# Patient Record
Sex: FEMALE | Race: WHITE
[De-identification: names, ages, dates, MRNs, and addresses within clinical notes are randomized per-mention and may not be internally consistent; named-entity substitution may affect disease eponyms.]

---

## 2018-07-18 ENCOUNTER — HOSPITAL ENCOUNTER (OUTPATIENT)
Dept: HOSPITAL 76 - LAB.F | Age: 48
Discharge: HOME | End: 2018-07-18
Attending: NURSE PRACTITIONER
Payer: COMMERCIAL

## 2018-07-18 DIAGNOSIS — Z72.51: Primary | ICD-10-CM

## 2018-07-18 PROCEDURE — 87491 CHLMYD TRACH DNA AMP PROBE: CPT

## 2018-07-18 PROCEDURE — 86592 SYPHILIS TEST NON-TREP QUAL: CPT

## 2018-07-18 PROCEDURE — 87389 HIV-1 AG W/HIV-1&-2 AB AG IA: CPT

## 2018-07-18 PROCEDURE — 81599 UNLISTED MAAA: CPT

## 2018-07-18 PROCEDURE — 87591 N.GONORRHOEAE DNA AMP PROB: CPT

## 2018-07-18 PROCEDURE — 36415 COLL VENOUS BLD VENIPUNCTURE: CPT

## 2018-07-19 LAB — HIV AG/AB 4TH GEN: (no result)

## 2018-08-19 ENCOUNTER — HOSPITAL ENCOUNTER (EMERGENCY)
Dept: HOSPITAL 76 - ED | Age: 48
Discharge: HOME | End: 2018-08-19
Payer: COMMERCIAL

## 2018-08-19 VITALS — DIASTOLIC BLOOD PRESSURE: 68 MMHG | SYSTOLIC BLOOD PRESSURE: 117 MMHG

## 2018-08-19 DIAGNOSIS — S90.821A: ICD-10-CM
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
## 2018-08-19 DIAGNOSIS — F17.200: ICD-10-CM
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
## 2018-08-19 DIAGNOSIS — S90.822A: Primary | ICD-10-CM

## 2018-08-19 PROCEDURE — 99283 EMERGENCY DEPT VISIT LOW MDM: CPT

## 2018-08-19 NOTE — ED PHYSICIAN DOCUMENTATION
History of Present Illness





- Stated complaint


Stated Complaint: BLISTERS ON FEET BOTTOM





- Chief complaint


Chief Complaint: Wound





- History obtained from


History obtained from: Patient





- History of Present Illness


Timing: Yesterday


Pain level max: 9


Pain level now: 9





- Additonal information


Additional information: 





Patient states that she has developed blisters on the soles of her bilateral 

feet after walking around [place] yesterday in the wrong shoes.  Worse with 

walking, better with rest.  No fevers.  Took Tylenol and Motrin earlier without 

relief





Review of Systems


Constitutional: denies: Fever, Chills


GI: denies: Vomiting


: denies: Now pregnant EGA





PD PAST MEDICAL HISTORY





- Past Medical History


Past Medical History: Yes


Cardiovascular: None


Respiratory: None


Neuro: Migraines


Endocrine/Autoimmune: None


GI: None


GYN: None


: None


HEENT: None


Psych: None


Musculoskeletal: Chronic back pain


Derm: None





- Past Surgical History


Past Surgical History: Yes


General: Cholecystectomy, Hiatal hernia repair


/GYN:  section, Endometrial ablation, Dilation and currettage, 

Hysterectomy, Oophrectomy





- Present Medications


Home Medications: 


 Ambulatory Orders











 Medication  Instructions  Recorded  Confirmed


 


Hydrocodone/Acetaminophen 1 - 2 each PO Q6H PRN #8 tablet 18 





[Hydrocodon-Acetaminophen 5-325]   


 


Ibuprofen [Motrin] 800 mg PO Q8H PRN #30 tablet 18 














- Allergies


Allergies/Adverse Reactions: 


 Allergies











Allergy/AdvReac Type Severity Reaction Status Date / Time


 


ketorolac [From Toradol] AdvReac  Unknown Verified 18 19:18


 


metronidazole [From Flagyl] AdvReac  Hives Verified 18 19:18


 


Penicillins AdvReac  Nausea Verified 18 19:18


 


prochlorperazine AdvReac  Unknown Verified 18 19:18





[From Compazine]     


 


sumatriptan [From Imitrex] AdvReac  Nausea Verified 18 19:18














- Social History


Does the pt smoke?: Yes


Smoking Status: Current every day smoker


Does the pt drink ETOH?: No


Does the pt have substance abuse?: Yes


Substance Use and Type: Marijuana





- Immunizations


Immunizations are current?: Yes


Immunizations: TDAP current <10years





- POLST


Patient has POLST: No





PD ED PE NORMAL





- Vitals


Vital signs reviewed: Yes





- General


General: Alert and oriented X 3, No acute distress





- HEENT


HEENT: Moist mucous membranes, Pharynx benign





- Neck


Neck: Supple, no meningeal sign





- Derm


Derm: Warm and dry





- Extremities


Extremities: Other (Mild erythema to the plantar aspects of the bilateral feet.

  There are blisters approximately 2 x 2 cm on the plantar aspect of the heels 

bilaterally.  Minimal fluid.  Also small blisters at the plantar aspect of the 

first metatarsalPhalangeal joints bilaterally.  Also has a small fluid-filled 

blister on the second toe of the right foot.  This is intact)





- Neuro


Neuro: Alert and oriented X 3





- Psych


Psych: Normal mood, Normal affect





Results





- Vitals


Vitals: 


 Vital Signs - 24 hr











  18





  19:13 20:16


 


Temperature 36.5 C 36.6 C


 


Heart Rate 77 70


 


Respiratory 18 16





Rate  


 


Blood Pressure 110/63 117/68


 


O2 Saturation 100 96








 Oxygen











O2 Source                      Room air

















PD MEDICAL DECISION MAKING





- ED course


Complexity details: considered differential, d/w patient


ED course: 





Patient with friction blisters to the bilateral feet.  Lidocaine gel was 

applied which helped her pain.  Also prescribe a small amount of pain 

medication for home.  We will have her perform Epsom salt soaks at home.  We 

will have her follow-up with her doctor for further care.  No signs of 

infection.  Tetanus is up-to-date.  Patient counseled regarding signs and 

symptoms for which I believe and urgent re-evaluation would be necessary. 

Patient with good understanding of and agreement to plan and is comfortable 

going home at this time





This document was made in part using voice recognition software. While efforts 

are made to proofread this document, sound alike and grammatical errors may 

occur.





- Sepsis Event


Vital Signs: 


 Vital Signs - 24 hr











  18 18





  19:13 20:16


 


Temperature 36.5 C 36.6 C


 


Heart Rate 77 70


 


Respiratory 18 16





Rate  


 


Blood Pressure 110/63 117/68


 


O2 Saturation 100 96








 Oxygen











O2 Source                      Room air

















Departure





- Departure


Disposition: 01 Home, Self Care


Clinical Impression: 


Friction blisters of sole of left foot


Qualifiers:


 Encounter type: initial encounter Qualified Code(s): S90.822A - Blister (

nonthermal), left foot, initial encounter





Friction blisters of sole of right foot


Qualifiers:


 Encounter type: initial encounter Qualified Code(s): S90.821A - Blister (

nonthermal), right foot, initial encounter





Condition: Good


Instructions:  ED Blister


Follow-Up: 


Franchesca Barnes ARNP [Primary Care Provider] - Within 3 Days (for wound check)


Prescriptions: 


Hydrocodone/Acetaminophen [Hydrocodon-Acetaminophen 5-325] 1 - 2 each PO Q6H 

PRN #8 tablet


 PRN Reason: pain


Ibuprofen [Motrin] 800 mg PO Q8H PRN #30 tablet


 PRN Reason: PAIN &/OR FEVER


Comments: 


Return if you worsen. You can soak your feet in epsom salts at home.


Discharge Date/Time: 18 20:16

## 2018-08-22 ENCOUNTER — HOSPITAL ENCOUNTER (OUTPATIENT)
Dept: HOSPITAL 76 - LAB.F | Age: 48
Discharge: HOME | End: 2018-08-22
Attending: NURSE PRACTITIONER
Payer: COMMERCIAL

## 2018-08-22 DIAGNOSIS — Z13.220: ICD-10-CM

## 2018-08-22 DIAGNOSIS — G62.9: Primary | ICD-10-CM

## 2018-08-22 DIAGNOSIS — Z13.1: ICD-10-CM

## 2018-08-22 LAB
ALBUMIN DIAFP-MCNC: 4.3 G/DL (ref 3.2–5.5)
ALBUMIN/GLOB SERPL: 1.4 {RATIO} (ref 1–2.2)
ALP SERPL-CCNC: 76 IU/L (ref 42–121)
ALT SERPL W P-5'-P-CCNC: 23 IU/L (ref 10–60)
ANION GAP SERPL CALCULATED.4IONS-SCNC: 8 MMOL/L (ref 6–13)
AST SERPL W P-5'-P-CCNC: 27 IU/L (ref 10–42)
BASOPHILS NFR BLD AUTO: 0 10^3/UL (ref 0–0.1)
BASOPHILS NFR BLD AUTO: 0.4 %
BILIRUB BLD-MCNC: 0.6 MG/DL (ref 0.2–1)
BUN SERPL-MCNC: 19 MG/DL (ref 6–20)
CALCIUM UR-MCNC: 9.5 MG/DL (ref 8.5–10.3)
CHLORIDE SERPL-SCNC: 103 MMOL/L (ref 101–111)
CHOLEST SERPL-MCNC: 238 MG/DL
CO2 SERPL-SCNC: 28 MMOL/L (ref 21–32)
CREAT SERPLBLD-SCNC: 0.6 MG/DL (ref 0.4–1)
EOSINOPHIL # BLD AUTO: 0.1 10^3/UL (ref 0–0.7)
EOSINOPHIL NFR BLD AUTO: 1.6 %
ERYTHROCYTE [DISTWIDTH] IN BLOOD BY AUTOMATED COUNT: 14.4 % (ref 12–15)
EST. AVERAGE GLUCOSE BLD GHB EST-MCNC: 103 MG/DL (ref 70–100)
GFRSERPLBLD MDRD-ARVRAT: 107 ML/MIN/{1.73_M2} (ref 89–?)
GLOBULIN SER-MCNC: 3 G/DL (ref 2.1–4.2)
GLUCOSE SERPL-MCNC: 92 MG/DL (ref 70–100)
HB2 TOTAL: 14.9 G/DL
HBA1C BLD-MCNC: 0.5 G/DL
HDLC SERPL-MCNC: 71 MG/DL
HDLC SERPL: 3.4 {RATIO} (ref ?–4.4)
HEMOGLOBIN A1C %: 5.2 % (ref 4.6–6.2)
HGB UR QL STRIP: 14.1 G/DL (ref 12–16)
LDLC SERPL CALC-MCNC: 136 MG/DL
LDLC/HDLC SERPL: 1.9 {RATIO} (ref ?–4.4)
LYMPHOCYTES # SPEC AUTO: 1.9 10^3/UL (ref 1.5–3.5)
LYMPHOCYTES NFR BLD AUTO: 30.5 %
MCH RBC QN AUTO: 33.3 PG (ref 27–31)
MCHC RBC AUTO-ENTMCNC: 34.6 G/DL (ref 32–36)
MCV RBC AUTO: 96.3 FL (ref 81–99)
MONOCYTES # BLD AUTO: 0.4 10^3/UL (ref 0–1)
MONOCYTES NFR BLD AUTO: 6.4 %
NEUTROPHILS # BLD AUTO: 3.9 10^3/UL (ref 1.5–6.6)
NEUTROPHILS # SNV AUTO: 6.4 X10^3/UL (ref 4.8–10.8)
NEUTROPHILS NFR BLD AUTO: 61.1 %
PDW BLD AUTO: 9.8 FL (ref 7.9–10.8)
PLATELET # BLD: 134 10^3/UL (ref 130–450)
PROT SPEC-MCNC: 7.3 G/DL (ref 6.7–8.2)
RBC MAR: 4.25 10^6/UL (ref 4.2–5.4)
SODIUM SERPLBLD-SCNC: 139 MMOL/L (ref 135–145)
VLDLC SERPL-SCNC: 31 MG/DL

## 2018-08-22 PROCEDURE — 84443 ASSAY THYROID STIM HORMONE: CPT

## 2018-08-22 PROCEDURE — 80053 COMPREHEN METABOLIC PANEL: CPT

## 2018-08-22 PROCEDURE — 80061 LIPID PANEL: CPT

## 2018-08-22 PROCEDURE — 83721 ASSAY OF BLOOD LIPOPROTEIN: CPT

## 2018-08-22 PROCEDURE — 85025 COMPLETE CBC W/AUTO DIFF WBC: CPT

## 2018-08-22 PROCEDURE — 83036 HEMOGLOBIN GLYCOSYLATED A1C: CPT

## 2018-08-22 PROCEDURE — 36415 COLL VENOUS BLD VENIPUNCTURE: CPT

## 2018-10-29 ENCOUNTER — HOSPITAL ENCOUNTER (EMERGENCY)
Dept: HOSPITAL 76 - ED | Age: 48
Discharge: HOME | End: 2018-10-29
Payer: COMMERCIAL

## 2018-10-29 VITALS — SYSTOLIC BLOOD PRESSURE: 183 MMHG | DIASTOLIC BLOOD PRESSURE: 83 MMHG

## 2018-10-29 DIAGNOSIS — F17.200: ICD-10-CM

## 2018-10-29 DIAGNOSIS — J02.9: Primary | ICD-10-CM

## 2018-10-29 DIAGNOSIS — R03.0: ICD-10-CM

## 2018-10-29 PROCEDURE — 99283 EMERGENCY DEPT VISIT LOW MDM: CPT

## 2018-10-29 PROCEDURE — 87070 CULTURE OTHR SPECIMN AEROBIC: CPT

## 2018-10-29 PROCEDURE — 87430 STREP A AG IA: CPT

## 2018-10-29 PROCEDURE — 87275 INFLUENZA B AG IF: CPT

## 2018-10-29 PROCEDURE — 87276 INFLUENZA A AG IF: CPT

## 2018-10-29 NOTE — ED PHYSICIAN DOCUMENTATION
PD HPI URI





- Stated complaint


Stated Complaint: SORE THROAT





- Chief complaint


Chief Complaint: Resp





- History obtained from


History obtained from: Patient





- History of Present Illness


Timing - onset: Other (She has had about a week of sore throat with intermittent

loss of voice associated with some nonproductive cough and postnasal drip 

without fevers or shortness of breath.)





Review of Systems


Constitutional: denies: Fever, Chills


Nose: reports: Rhinorrhea / runny nose, Congestion


Throat: reports: Sore throat


Respiratory: reports: Cough.  denies: Dyspnea





PD PAST MEDICAL HISTORY





- Past Medical History


Past Medical History: Yes


Cardiovascular: None


Respiratory: None


Neuro: Migraines


Endocrine/Autoimmune: None


GI: None


GYN: None


: None


HEENT: None


Psych: None


Musculoskeletal: Chronic back pain


Derm: None





- Past Surgical History


Past Surgical History: Yes


General: Cholecystectomy, Hiatal hernia repair


/GYN:  section, Endometrial ablation, Dilation and currettage, 

Hysterectomy, Oophrectomy





- Present Medications


Home Medications: 


                                Ambulatory Orders











 Medication  Instructions  Recorded  Confirmed


 


Hydrocodone/Acetaminophen 1 - 2 each PO Q6H PRN #8 tablet 18 





[Hydrocodon-Acetaminophen 5-325]   


 


Ibuprofen [Motrin] 800 mg PO Q8H PRN #30 tablet 18 


 


Hydrocodone/Acetaminophen 1 - 2 each PO Q6H PRN #7 tablet 10/29/18 





[Hydrocodon-Acetaminophen 5-325]   


 


predniSONE [Prednisone] 60 mg PO DAILY 5 Days #15 tablet 10/29/18 














- Allergies


Allergies/Adverse Reactions: 


                                    Allergies











Allergy/AdvReac Type Severity Reaction Status Date / Time


 


ketorolac [From Toradol] AdvReac  Unknown Verified 10/29/18 18:06


 


metronidazole [From Flagyl] AdvReac  Hives Verified 10/29/18 18:06


 


Penicillins AdvReac  Nausea Verified 10/29/18 18:06


 


prochlorperazine AdvReac  Unknown Verified 10/29/18 18:06





[From Compazine]     


 


sumatriptan [From Imitrex] AdvReac  Nausea Verified 10/29/18 18:06














- Social History


Does the pt smoke?: Yes


Smoking Status: Current every day smoker


Does the pt drink ETOH?: No


Does the pt have substance abuse?: Yes





- Immunizations


Immunizations are current?: Yes


Immunizations: TDAP current <10years





- POLST


Patient has POLST: No





PD ED PE NORMAL





- Vitals


Vital signs reviewed: Yes





- General


General: Alert and oriented X 3, No acute distress





- HEENT


HEENT: PERRL, EOMI, Ears normal, Pharynx benign (No evidence of oropharyngeal 

irritation, no adenopathy)





- Neck


Neck: Supple, no meningeal sign





- Cardiac


Cardiac: RRR, No murmur





- Respiratory


Respiratory: No respiratory distress, Other (Diminished throughout without focal

findings)





- Abdomen


Abdomen: Non tender





- Neuro


Neuro: Alert and oriented X 3, Normal speech





Results





- Vitals


Vitals: 





                               Vital Signs - 24 hr











  10/29/18 10/29/18





  18:00 20:33


 


Temperature 36.8 C 36.9 C


 


Heart Rate 83 78


 


Respiratory 20 18





Rate  


 


Blood Pressure 128/75 132/79 H


 


O2 Saturation 98 97








                                     Oxygen











O2 Source                      Room air

















- Labs


Labs: 





                                Laboratory Tests











  10/29/18 10/29/18





  18:10 18:10


 


Influenza A (Rapid)   Negative


 


Influenza B (Rapid)   Negative


 


Group A Strep Rapid  Negative 














Departure





- Departure


Disposition: 01 Home, Self Care


Clinical Impression: 


 Viral pharyngitis





Condition: Good


Record reviewed to determine appropriate education?: Yes


Instructions:  ED Pharyngitis Viral


Prescriptions: 


Hydrocodone/Acetaminophen [Hydrocodon-Acetaminophen 5-325] 1 - 2 each PO Q6H PRN

#7 tablet


 PRN Reason: pain


predniSONE [Prednisone] 60 mg PO DAILY 5 Days #15 tablet


Comments: 


Call your doctor to arrange a follow-up appointment, make the next available 

appointment.  In the interim, return anytime if worse or if new symptoms 

develop.





Your blood pressure was elevated today on check into the emergency department.  

This does not mean that you have hypertension, it is a common phenomenon to come

 to the emergency department and have elevated blood pressure.  I recommend that

 you see your primary care physician within the week to have it rechecked when 

you are feeling better.

## 2018-11-08 ENCOUNTER — HOSPITAL ENCOUNTER (OUTPATIENT)
Dept: HOSPITAL 76 - DI | Age: 48
Discharge: HOME | End: 2018-11-08
Attending: NURSE PRACTITIONER
Payer: COMMERCIAL

## 2018-11-08 DIAGNOSIS — R53.83: ICD-10-CM

## 2018-11-08 DIAGNOSIS — M54.6: Primary | ICD-10-CM

## 2018-11-08 DIAGNOSIS — E78.5: ICD-10-CM

## 2018-11-08 DIAGNOSIS — G62.9: ICD-10-CM

## 2018-11-08 LAB
ALBUMIN DIAFP-MCNC: 4.2 G/DL (ref 3.2–5.5)
ALP SERPL-CCNC: 77 IU/L (ref 42–121)
ALT SERPL W P-5'-P-CCNC: 14 IU/L (ref 10–60)
AST SERPL W P-5'-P-CCNC: 19 IU/L (ref 10–42)
BILIRUB BLD-MCNC: 0.4 MG/DL (ref 0.2–1)
BILIRUB DIRECT SERPL-MCNC: < 0.1 MG/DL (ref 0.1–0.5)
CRP SERPL-MCNC: < 1 MG/DL (ref 0–1)
GLOBULIN SER-MCNC: 2.9 G/DL (ref 2.1–4.2)
PROT SPEC-MCNC: 7.1 G/DL (ref 6.7–8.2)

## 2018-11-08 PROCEDURE — 85651 RBC SED RATE NONAUTOMATED: CPT

## 2018-11-08 PROCEDURE — 86140 C-REACTIVE PROTEIN: CPT

## 2018-11-08 PROCEDURE — 80076 HEPATIC FUNCTION PANEL: CPT

## 2018-11-08 PROCEDURE — 72072 X-RAY EXAM THORAC SPINE 3VWS: CPT

## 2018-11-08 PROCEDURE — 36415 COLL VENOUS BLD VENIPUNCTURE: CPT

## 2018-11-08 PROCEDURE — 86038 ANTINUCLEAR ANTIBODIES: CPT

## 2018-11-08 PROCEDURE — 86430 RHEUMATOID FACTOR TEST QUAL: CPT

## 2018-11-08 NOTE — XRAY REPORT
Reason:  THORACIC BACK PAIN

Procedure Date:  11/08/2018   

Accession Number:  [id number] / [id number]                    

Procedure:  XR  - Thoracic Spine 3 View CPT Code:  

 

FULL RESULT:

 

 

EXAM:

THORACIC SPINE RADIOGRAPHY

 

EXAM DATE: 11/8/2018 12:14 PM.

 

CLINICAL HISTORY: Thoracic back pain.

 

COMPARISON: None.

 

TECHNIQUE: 2 views.

 

FINDINGS:

Alignment: Normal. No spondylolisthesis or scoliosis.

 

Bones: No fractures or bone lesions.

 

Disks: Normal. Disk heights are maintained.

 

Soft Tissues: Normal. The visualized lungs and cardiomediastinal 

silhouette are normal.

IMPRESSION: Normal thoracic spine radiography.

 

[facility]

## 2018-11-25 ENCOUNTER — HOSPITAL ENCOUNTER (EMERGENCY)
Dept: HOSPITAL 76 - ED | Age: 48
Discharge: HOME | End: 2018-11-25
Payer: COMMERCIAL

## 2018-11-25 VITALS — SYSTOLIC BLOOD PRESSURE: 146 MMHG | DIASTOLIC BLOOD PRESSURE: 88 MMHG

## 2018-11-25 DIAGNOSIS — F17.200: ICD-10-CM

## 2018-11-25 DIAGNOSIS — R03.0: ICD-10-CM

## 2018-11-25 DIAGNOSIS — N76.4: Primary | ICD-10-CM

## 2018-11-25 LAB
CLARITY UR REFRACT.AUTO: CLEAR
GLUCOSE UR QL STRIP.AUTO: NEGATIVE MG/DL
HCG UR QL: NEGATIVE
KETONES UR QL STRIP.AUTO: NEGATIVE MG/DL
NITRITE UR QL STRIP.AUTO: NEGATIVE
PH UR STRIP.AUTO: 5.5 PH (ref 5–7.5)
PROT UR STRIP.AUTO-MCNC: NEGATIVE MG/DL
RBC # UR STRIP.AUTO: NEGATIVE /UL
SP GR UR STRIP.AUTO: 1.02 (ref 1–1.03)
UROBILINOGEN UR QL STRIP.AUTO: (no result) E.U./DL
UROBILINOGEN UR STRIP.AUTO-MCNC: NEGATIVE MG/DL

## 2018-11-25 PROCEDURE — 81001 URINALYSIS AUTO W/SCOPE: CPT

## 2018-11-25 PROCEDURE — 81003 URINALYSIS AUTO W/O SCOPE: CPT

## 2018-11-25 PROCEDURE — 96372 THER/PROPH/DIAG INJ SC/IM: CPT

## 2018-11-25 PROCEDURE — 87086 URINE CULTURE/COLONY COUNT: CPT

## 2018-11-25 PROCEDURE — 56405 I&D VULVA/PERINEAL ABSCESS: CPT

## 2018-11-25 PROCEDURE — 81025 URINE PREGNANCY TEST: CPT

## 2018-11-25 PROCEDURE — 99283 EMERGENCY DEPT VISIT LOW MDM: CPT

## 2018-11-25 NOTE — ED PHYSICIAN DOCUMENTATION
History of Present Illness





- Stated complaint


Stated Complaint: FEMALE 





- Chief complaint


Chief Complaint: General





- History obtained from


History obtained from: Patient





- History of Present Illness


Timing: Other (1 week's worth of an increasingly painful and swollen lesion 

"ingrown hair" on the left side of the labia.  No history of MRSA.)





Review of Systems


Constitutional: denies: Fever, Chills


Cardiac: denies: Chest pain / pressure, Palpitations


Respiratory: denies: Dyspnea, Cough


GI: denies: Abdominal Pain, Nausea, Vomiting


: reports: Hysterectomy





PD PAST MEDICAL HISTORY





- Past Medical History


Cardiovascular: None


Respiratory: None


Neuro: Migraines


Endocrine/Autoimmune: None


GI: None


GYN: None


: None


HEENT: None


Psych: None


Musculoskeletal: Chronic back pain


Derm: None





- Past Surgical History


Past Surgical History: Yes


General: Cholecystectomy, Hiatal hernia repair


/GYN:  section, Endometrial ablation, Dilation and currettage, 

Hysterectomy, Oophrectomy





- Present Medications


Home Medications: 


                                Ambulatory Orders











 Medication  Instructions  Recorded  Confirmed


 


Hydrocodone/Acetaminophen 1 - 2 each PO Q6H PRN #8 tablet 18 





[Hydrocodon-Acetaminophen 5-325]   


 


Ibuprofen [Motrin] 800 mg PO Q8H PRN #30 tablet 18 


 


Hydrocodone/Acetaminophen 1 - 2 each PO Q6H PRN #7 tablet 10/29/18 





[Hydrocodon-Acetaminophen 5-325]   


 


predniSONE [Prednisone] 60 mg PO DAILY 5 Days #15 tablet 10/29/18 


 


Doxycycline Hyclate 100 mg PO BID #20 capsule 18 


 


Hydrocodone/Acetaminophen 1 - 2 each PO Q6H PRN #14 tablet 18 





[Hydrocodon-Acetaminophen 5-325]   














- Allergies


Allergies/Adverse Reactions: 


                                    Allergies











Allergy/AdvReac Type Severity Reaction Status Date / Time


 


ketorolac [From Toradol] AdvReac  Unknown Verified 18 11:10


 


metronidazole [From Flagyl] AdvReac  Hives Verified 18 11:10


 


Penicillins AdvReac  Nausea Verified 18 11:10


 


prochlorperazine AdvReac  Unknown Verified 18 11:10





[From Compazine]     


 


sumatriptan [From Imitrex] AdvReac  Nausea Verified 18 11:10














- Social History


Does the pt smoke?: Yes


Smoking Status: Current every day smoker


Does the pt drink ETOH?: No


Does the pt have substance abuse?: Yes





- Immunizations


Immunizations are current?: Yes


Immunizations: TDAP current <10years





- POLST


Patient has POLST: No





PD ED PE NORMAL





- Vitals


Vital signs reviewed: Yes





- General


General: Alert and oriented X 3, No acute distress





- Female 


Female : Chaperone present (Tracey SAUCEDO RN), Other (Pointed external abscess L 

labia, not Bartholin)





- Derm


Derm: Normal color, Warm and dry





- Neuro


Neuro: Alert and oriented X 3, CNs 2-12 intact, Normal speech





- Psych


Psych: Normal mood, Normal affect





Results





- Vitals


Vitals: 


                               Vital Signs - 24 hr











  18





  11:08


 


Temperature 35.8 C L


 


Heart Rate 84


 


Respiratory 16





Rate 


 


Blood Pressure 122/85 H


 


O2 Saturation 99








                                     Oxygen











O2 Source                      Room air

















- Labs


Labs: 


                                Laboratory Tests











  18





  11:18


 


Urine Color  YELLOW


 


Urine Clarity  CLEAR


 


Urine pH  5.5


 


Ur Specific Gravity  1.025


 


Urine Protein  NEGATIVE


 


Urine Glucose (UA)  NEGATIVE


 


Urine Ketones  NEGATIVE


 


Urine Occult Blood  NEGATIVE


 


Urine Nitrite  NEGATIVE


 


Urine Bilirubin  NEGATIVE


 


Urine Urobilinogen  0.2 (NORMAL)


 


Ur Leukocyte Esterase  NEGATIVE


 


Ur Microscopic Review  NOT INDICATED


 


Urine Culture Comments  NOT INDICATED


 


Urine HCG, Qual  NEGATIVE














Procedures





- Abscess I&D (location)


  ** L labia


Preparation: Alcohol, Lidocaine 1%


Incision: Incised with scalpel, Purulent drainage, Loculations broken.  No: 

Packed (too small)


Other: Pt tolerated well, Dressing applied, Antibiotic prescribed





Departure





- Departure


Disposition: 01 Home, Self Care


Clinical Impression: 


 Labial abscess





Condition: Good


Record reviewed to determine appropriate education?: Yes


Instructions:  ED Abscess IandD


Prescriptions: 


Doxycycline Hyclate 100 mg PO BID #20 capsule


Hydrocodone/Acetaminophen [Hydrocodon-Acetaminophen 5-325] 1 - 2 each PO Q6H PRN

#14 tablet


 PRN Reason: pain


Comments: 


Call your doctor to arrange a follow-up appointment, make the next available 

appointment.  In the interim, return anytime if worse or if new symptoms 

develop.





Do not drink or drive while taking narcotic pain medication.


Note that many narcotic pain relievers also contain Tylenol/acetaminophen.  

Please ensure that your total dose of acetaminophen from all sources does not 

exceed 3 g (3000 mg) per day.


You may get constipated while on this medication.  Take a stool softener such as

Colace twice a day while you are on it.  Also add an over-the-counter laxative 

such as senna or MiraLAX on any day that you do not have a bowel movement.


If you received a narcotic pain medication or sedative while in the emergency 

department, do not drive for the next 24 hours.





Your blood pressure was elevated today on check into the emergency department.  

This does not mean that you have hypertension, it is a common phenomenon to come

to the emergency department and have elevated blood pressure.  I recommend that 

you see your primary care physician within the week to have it rechecked when 

you are feeling better.

## 2019-01-14 ENCOUNTER — HOSPITAL ENCOUNTER (OUTPATIENT)
Dept: HOSPITAL 76 - DI | Age: 49
Discharge: HOME | End: 2019-01-14
Attending: ANESTHESIOLOGY
Payer: COMMERCIAL

## 2019-01-14 DIAGNOSIS — M51.36: Primary | ICD-10-CM

## 2019-01-14 PROCEDURE — 72148 MRI LUMBAR SPINE W/O DYE: CPT

## 2019-01-14 NOTE — MRI REPORT
Reason:  LOW BACK PAIN

Procedure Date:  01/14/2019   

Accession Number:  [id number] / [id number]                    

Procedure:  MRI - Lumbar Spine W/O CPT Code:  

 

FULL RESULT:

 

 

MRI LUMBAR SPINE WITHOUT CONTRAST

 

INDICATION: 48-year-old female with worsening low back pain. Please 

assess.

 

TECHNIQUE:

1. Sagittal STIR, T1 and T2.

2. Axial T1 and T2.

 

COMPARISON: None.

 

FINDINGS:

Assessment assumes that there are 5 non-rib-bearing lumbar type 

vertebrae. The last fully articulated level has been labeled L5.

 

Vertebral alignment is essentially normal.

 

There is absence of normal T2 signal from the T11-T12 disk confirming 

disk degeneration. In addition, there is at least partial absence of 

normal T2 signal from the T12-L1, L1-L2 and L3-L4 disks suggesting at 

least early degenerative change. By comparison, the L2-L3, L4-L5 and 

L5-S1 disks appear relatively well hydrated. The lumbar disk space 

heights are maintained throughout.

 

Schmorl node deformities are noted in the vertebral endplates at T11-T12, 

T12-L1, L1-L2 and L2-L3.

 

There is minor type I reactive marrow change in the vertebral endplates 

at L2-L3 and L3-L4. The marrow signal intensity is otherwise unremarkable.

 

The conus terminates in appropriate fashion at about the mid L1 level. 

There is no abnormal thickening or lipomatous change of the filum 

terminale.

 

Imaged only in the sagittal plane is a tiny posterocentral protrusion at 

T11-T12. It causes minimal mass effect on the thecal sac. No significant 

spinal stenosis.

 

Axial images:

T12-L1: No disk herniation. No spinal canal or foraminal stenosis.

 

L1-L2: No disk herniation. No spinal canal or foraminal stenosis.

 

L2-L3: Small intraforaminal/extraforaminal protrusions or extrusions 

bilaterally. Minimal foraminal narrowing. No spinal stenosis.

 

L3-L4: Minor intraforaminal protrusions bilaterally. No spinal stenosis. 

Minimal foraminal narrowing.

 

L4-L5: No focal disk herniation. Minimal redundancy of the ligamenta 

flava. No spinal canal or foraminal stenosis.

 

L5-S1: Degenerative facet arthrosis without associated bony hypertrophy. 

No disk herniation. No spinal canal or foraminal stenosis.

IMPRESSION:

1. Early degenerative disk and facet changes are seen in the lumbar spine 

as documented in detail above.

2. No spinal stenosis or significant foraminal narrowing. No evidence of 

neural impingement.

3. No other specific diagnostic findings.

## 2019-02-14 ENCOUNTER — HOSPITAL ENCOUNTER (EMERGENCY)
Dept: HOSPITAL 76 - ED | Age: 49
Discharge: HOME | End: 2019-02-14
Payer: COMMERCIAL

## 2019-02-14 VITALS — DIASTOLIC BLOOD PRESSURE: 72 MMHG | SYSTOLIC BLOOD PRESSURE: 138 MMHG

## 2019-02-14 DIAGNOSIS — F17.200: ICD-10-CM

## 2019-02-14 DIAGNOSIS — J01.01: ICD-10-CM

## 2019-02-14 DIAGNOSIS — R07.89: Primary | ICD-10-CM

## 2019-02-14 PROCEDURE — 84484 ASSAY OF TROPONIN QUANT: CPT

## 2019-02-14 PROCEDURE — 71046 X-RAY EXAM CHEST 2 VIEWS: CPT

## 2019-02-14 PROCEDURE — 99283 EMERGENCY DEPT VISIT LOW MDM: CPT

## 2019-02-14 PROCEDURE — 93005 ELECTROCARDIOGRAM TRACING: CPT

## 2019-02-14 PROCEDURE — 94664 DEMO&/EVAL PT USE INHALER: CPT

## 2019-02-14 PROCEDURE — 94640 AIRWAY INHALATION TREATMENT: CPT

## 2019-02-14 PROCEDURE — 36415 COLL VENOUS BLD VENIPUNCTURE: CPT

## 2019-02-14 NOTE — ED PHYSICIAN DOCUMENTATION
PD HPI URI





- Stated complaint


Stated Complaint: SOA/COUGH/EAR PX





- Chief complaint


Chief Complaint: Heent





- History obtained from


History obtained from: Patient





- History of Present Illness


Timing - onset: Other (1 week of cough, sinus congestion, now with chest 

congestion and chest pressure with yesterday and left ear pain today. Short of 

breath with exertion.)





Review of Systems


Constitutional: reports: Fever, Chills, Myalgias, Fatigue


Nose: reports: Rhinorrhea / runny nose, Congestion, Sinus pressure / pain


Throat: reports: Sore throat


Cardiac: reports: Chest pain / pressure


Respiratory: reports: Dyspnea, Cough


GI: reports: Diarrhea (for 3 days ago, finished 3 days ago).  denies: Abdominal 

Pain


Skin: denies: Rash


Musculoskeletal: denies: Neck pain, Back pain





PD PAST MEDICAL HISTORY





- Past Medical History


Cardiovascular: None


Respiratory: None


Neuro: Migraines


Endocrine/Autoimmune: None


GI: None


GYN: None


: None


HEENT: None


Psych: None


Musculoskeletal: Chronic back pain


Derm: None





- Past Surgical History


Past Surgical History: Yes


General: Cholecystectomy, Hiatal hernia repair


/GYN:  section, Endometrial ablation, Dilation and currettage, 

Hysterectomy, Oophrectomy





- Present Medications


Home Medications: 


                                Ambulatory Orders











 Medication  Instructions  Recorded  Confirmed


 


Hydrocodone/Acetaminophen 1 - 2 each PO Q6H PRN #8 tablet 18 





[Hydrocodon-Acetaminophen 5-325]   


 


Ibuprofen [Motrin] 800 mg PO Q8H PRN #30 tablet 18 


 


Hydrocodone/Acetaminophen 1 - 2 each PO Q6H PRN #7 tablet 10/29/18 





[Hydrocodon-Acetaminophen 5-325]   


 


predniSONE [Prednisone] 60 mg PO DAILY 5 Days #15 tablet 10/29/18 


 


Doxycycline Hyclate 100 mg PO BID #20 capsule 18 


 


Hydrocodone/Acetaminophen 1 - 2 each PO Q6H PRN #14 tablet 18 





[Hydrocodon-Acetaminophen 5-325]   


 


Albuterol Sulf [Ventolin Hfa 1 - 2 puffs INH Q4HR PRN #1 inhaler 19 





Inhaler]   


 


Amox/Clav 875/125 [Augmentin] 1 each PO Q12H #20 tablet 19 


 


guaiFENesin/CODEINE [Robitussin AC] 5 - 10 ml PO Q6H PRN #120 ml 19 














- Allergies


Allergies/Adverse Reactions: 


                                    Allergies











Allergy/AdvReac Type Severity Reaction Status Date / Time


 


ketorolac [From Toradol] AdvReac  Unknown Verified 18 11:10


 


metronidazole [From Flagyl] AdvReac  Hives Verified 18 11:10


 


Penicillins AdvReac  Nausea Verified 18 11:10


 


prochlorperazine AdvReac  Unknown Verified 18 11:10





[From Compazine]     


 


sumatriptan [From Imitrex] AdvReac  Nausea Verified 18 11:10














- Social History


Does the pt smoke?: Yes


Smoking Status: Current every day smoker


Does the pt drink ETOH?: No


Does the pt have substance abuse?: Yes





- Immunizations


Immunizations are current?: Yes


Immunizations: TDAP current <10years





- POLST


Patient has POLST: No





PD ED PE NORMAL





- Vitals


Vital signs reviewed: Yes





- General


General: Alert and oriented X 3, No acute distress





- HEENT


HEENT: PERRL, EOMI, Ears normal, Pharynx benign, Other (Mild TTP both sinuses, 

mOD L OM)





- Neck


Neck: Supple, no meningeal sign, No bony TTP





- Cardiac


Cardiac: RRR, No murmur





- Respiratory


Respiratory: No respiratory distress, Clear bilaterally





- Abdomen


Abdomen: Non tender





- Back


Back: No CVA TTP, No spinal TTP





- Derm


Derm: Normal color, Warm and dry





- Extremities


Extremities: No edema, No calf tenderness / cord





- Neuro


Neuro: Alert and oriented X 3, Normal speech





Results





- Vitals


Vitals: 


                               Vital Signs - 24 hr











  19





  18:53 20:41


 


Temperature 36.1 C L 


 


Heart Rate 85 85


 


Respiratory 18 20





Rate  


 


Blood Pressure 133/78 H 


 


O2 Saturation 99 








                                     Oxygen











O2 Source                      Room air

















- EKG (time done)


  ** 1900


Rate: Rate (enter#) (83)


Rhythm: NSR


Axis: Normal


Intervals: Normal ME


QRS: Normal


Ischemia: Normal ST segments


Computer interpretation: Disagree with computer (pretty normal)





- Labs


Labs: 


                                Laboratory Tests











  19





  20:53


 


Troponin I  < 0.04














Departure





- Departure


Disposition: 01 Home, Self Care


Clinical Impression: 


 Chest pressure





Sinusitis


Qualifiers:


 Sinusitis location: maxillary Chronicity: acute Recurrence: recurrent Qualified

Code(s): J01.01 - Acute recurrent maxillary sinusitis





Condition: Good


Record reviewed to determine appropriate education?: Yes


Instructions:  ED Sinusitis Abx Tx


Prescriptions: 


Albuterol Sulf [Ventolin Hfa Inhaler] 1 - 2 puffs INH Q4HR PRN #1 inhaler


 PRN Reason: Shortness Of Air/Wheezing


Amox/Clav 875/125 [Augmentin] 1 each PO Q12H #20 tablet


guaiFENesin/CODEINE [Robitussin AC] 5 - 10 ml PO Q6H PRN #120 ml


 PRN Reason: Cough


Comments: 


Call your doctor to arrange a follow-up appointment, make the next available 

appointment.  In the interim, return anytime if worse or if new symptoms 

develop.

## 2019-02-14 NOTE — XRAY REPORT
Reason:  cough

Procedure Date:  02/14/2019   

Accession Number:  [id number] / [id number]                    

Procedure:  XR  - Chest 2 View X-Ray CPT Code:  80893

 

FULL RESULT:

 

 

EXAM:

CHEST RADIOGRAPHY

 

EXAM DATE: 2/14/2019 09:23 PM.

 

CLINICAL HISTORY: Cough.

 

COMPARISON: THORACIC SPINE 3 VIEW 11/08/2018 12:06 PM.

 

TECHNIQUE: 2 views.

 

FINDINGS:

Heart size is normal. Faint patchy opacity in the right infrahilar 

region. Otherwise no consolidation, pleural effusion, no thorax.

IMPRESSION: Faint opacity in the infrahilar right lung, which may 

represent atelectasis or developing pneumonia.

 

[facility]

## 2019-04-10 ENCOUNTER — HOSPITAL ENCOUNTER (OUTPATIENT)
Dept: HOSPITAL 76 - LAB | Age: 49
Discharge: HOME | End: 2019-04-10
Attending: NURSE PRACTITIONER
Payer: MEDICAID

## 2019-04-10 DIAGNOSIS — R60.0: ICD-10-CM

## 2019-04-10 DIAGNOSIS — E78.5: Primary | ICD-10-CM

## 2019-04-10 LAB
ALBUMIN DIAFP-MCNC: 4 G/DL (ref 3.2–5.5)
ALP SERPL-CCNC: 97 IU/L (ref 42–121)
ALT SERPL W P-5'-P-CCNC: 20 IU/L (ref 10–60)
AST SERPL W P-5'-P-CCNC: 23 IU/L (ref 10–42)
BILIRUB BLD-MCNC: 0.5 MG/DL (ref 0.2–1)
BILIRUB DIRECT SERPL-MCNC: 0.1 MG/DL (ref 0.1–0.5)
CHOLEST SERPL-MCNC: 227 MG/DL
GLOBULIN SER-MCNC: 3.2 G/DL (ref 2.1–4.2)
HDLC SERPL-MCNC: 48 MG/DL
HDLC SERPL: 4.7 {RATIO} (ref ?–4.4)
LDLC SERPL CALC-MCNC: 148 MG/DL
LDLC/HDLC SERPL: 3.1 {RATIO} (ref ?–4.4)
PROT SPEC-MCNC: 7.2 G/DL (ref 6.7–8.2)
VLDLC SERPL-SCNC: 31 MG/DL

## 2019-04-10 PROCEDURE — 36415 COLL VENOUS BLD VENIPUNCTURE: CPT

## 2019-04-10 PROCEDURE — 80061 LIPID PANEL: CPT

## 2019-04-10 PROCEDURE — 80076 HEPATIC FUNCTION PANEL: CPT

## 2019-04-10 PROCEDURE — 85379 FIBRIN DEGRADATION QUANT: CPT

## 2019-04-10 PROCEDURE — 83721 ASSAY OF BLOOD LIPOPROTEIN: CPT

## 2019-04-17 ENCOUNTER — HOSPITAL ENCOUNTER (OUTPATIENT)
Dept: HOSPITAL 76 - DI | Age: 49
Discharge: HOME | End: 2019-04-17
Attending: NURSE PRACTITIONER
Payer: MEDICAID

## 2019-04-17 DIAGNOSIS — R60.0: Primary | ICD-10-CM

## 2019-04-17 NOTE — ULTRASOUND REPORT
Reason:  EDEMA OF LOWER EXTREMITY

Procedure Date:  04/17/2019   

Accession Number:  [id number] / [id number]                    

Procedure:  US  - Duplex Ext Veins Right CPT Code:  

 

FULL RESULT:

 

 

EXAM:

RIGHT LOWER EXTREMITY VENOUS ULTRASOUND

 

EXAM DATE: 4/17/2019 10:18 PM.

 

CLINICAL HISTORY: EDEMA OF LOWER EXTREMITY.

 

COMPARISON: None.

 

TECHNIQUE: Real-time sonographic vascular imaging was performed by the 

sonographer through the lower extremity utilizing both color-flow and 

Doppler spectral analysis. Multiple representative static images were 

saved for review.

 

FINDINGS:

Common Femoral Vein (CFV): Normal.

CFV-GSV Junction: Normal.

Profunda Femoral Vein (PFV): Normal.

Femoral Vein (FV) Prox: Normal.

Femoral Vein (FV) Mid: Normal.

Femoral Vein (FV) Dist: Normal.

Popliteal Vein: Normal.

Posterior Tibial Veins: Normal.

Peroneal Veins: Normal.

Contralateral Side CFV: Normal.

 

Other: None.

IMPRESSION: No evidence for right leg deep venous thrombosis.

 

[facility]

## 2019-06-06 ENCOUNTER — HOSPITAL ENCOUNTER (OUTPATIENT)
Dept: HOSPITAL 76 - DI | Age: 49
Discharge: HOME | End: 2019-06-06
Attending: NURSE PRACTITIONER
Payer: MEDICAID

## 2019-06-06 DIAGNOSIS — M41.9: Primary | ICD-10-CM

## 2019-06-06 DIAGNOSIS — M50.322: ICD-10-CM

## 2019-06-06 DIAGNOSIS — M47.812: ICD-10-CM

## 2019-06-06 PROCEDURE — 72040 X-RAY EXAM NECK SPINE 2-3 VW: CPT

## 2019-06-06 PROCEDURE — 72070 X-RAY EXAM THORAC SPINE 2VWS: CPT

## 2019-06-07 NOTE — XRAY REPORT
Reason:  THORACIC SPINE   NECK PAIN

Procedure Date:  06/06/2019   

Accession Number:  [id number] / [id number]                    

Procedure:  XR  - Thoracic Spine 2 View CPT Code:  

 

FULL RESULT:

 

 

EXAM:

THORACIC SPINE RADIOGRAPHY

 

EXAM DATE: 6/6/2019 04:52 PM.

 

CLINICAL HISTORY: Thoracic spine. Neck pain.

 

COMPARISON: CHEST 2 VIEW 02/14/2019 9:07 PM.

 

TECHNIQUE: 2 views.

 

FINDINGS:

Alignment: There is a mild dextroconvex 8-degree thoracic scoliosis, more 

pronounced on the current study when compared to the February 2019 chest 

radiograph. No listhesis.

 

Bones: No fractures or bone lesions.

 

Disks: Normal. Disk heights are maintained.

 

Soft Tissues: Normal. The visualized lungs and cardiomediastinal 

silhouette are normal.

IMPRESSION: Mild scoliosis.

 

[facility]

## 2019-07-12 ENCOUNTER — HOSPITAL ENCOUNTER (OUTPATIENT)
Dept: HOSPITAL 76 - DI | Age: 49
Discharge: HOME | End: 2019-07-12
Attending: NURSE PRACTITIONER
Payer: MEDICAID

## 2019-07-12 DIAGNOSIS — M50.221: Primary | ICD-10-CM

## 2019-07-12 PROCEDURE — 72141 MRI NECK SPINE W/O DYE: CPT

## 2019-07-13 NOTE — MRI REPORT
Reason:  NECK PAIN

Procedure Date:  07/12/2019   

Accession Number:  [id number] / [id number]                    

Procedure:  MRI - Cervical Spine W/O CPT Code:  

 

FULL RESULT:

 

 

EXAM:

MRI CERVICAL SPINE WITHOUT CONTRAST

 

EXAM DATE: 7/12/2019 01:37 PM.

 

CLINICAL HISTORY: Chronic neck PAIN.

 

COMPARISONS: CERVICAL SPINE 2 VIEW 06/06/2019 4:31 PM.

 

TECHNIQUE: Multiplanar, multisequence T1-weighted and fluid-sensitive 

sequences of the cervical spine without contrast. Other: None.

 

FINDINGS:

Neurologic Structures: The visualized posterior fossa structures are 

unremarkable. No signal abnormality in the visualized spinal cord.

 

Alignment:

Anterolisthesis 2 mm C4 and C5.

 

Bone Marrow: No gross fractures or bone lesions. No marrow edema.

 

Interspace Levels/Facets:

C1-C2: Unremarkable.

 

C2-C3: Mild bilateral facet joint arthrosis. Negative for spinal canal 

stenosis or foraminal stenosis.

 

C3-C4: The neural foramina are negative for stenosis. The facet joints 

are within normal limits.

C4-C5: Left posterolateral 2 mm disk protrusion with mild left neural 

foramen entry zone stenosis. Negative for central spinal canal stenosis. 

The right neural foramen is negative for stenosis. Anterolisthesis 2 mm 

C3 on C4. Mild left facet joint arthrosis.

 

C5-C6: Mild disk degeneration. Facet joints are within normal limits. 

Negative for spinal canal stenosis or foraminal stenosis.

 

C6-C7: Negative for spinal canal stenosis or foraminal stenosis.

 

C7-T1: Mild left facet joint arthrosis. Mild bilateral foraminal stenosis 

from facet hypertrophy.

 

Musculature: Normal. No edema or fatty atrophy.

 

Other: The paravertebral and prevertebral soft tissues are normal.

IMPRESSION:

1. The spinal cord from the cervical medullary junction through T2 is 

negative for signal abnormality.

2. Left posterolateral 2 mm C4-C5 disk protrusion with mild left neural 

foramen entry zone stenosis.

3. Negative for disk extrusion or central spinal canal stenosis.

 

[facility]

## 2019-09-11 ENCOUNTER — HOSPITAL ENCOUNTER (EMERGENCY)
Dept: HOSPITAL 76 - ED | Age: 49
Discharge: HOME | End: 2019-09-11
Payer: MEDICAID

## 2019-09-11 VITALS — SYSTOLIC BLOOD PRESSURE: 122 MMHG | DIASTOLIC BLOOD PRESSURE: 81 MMHG

## 2019-09-11 DIAGNOSIS — F17.200: ICD-10-CM

## 2019-09-11 DIAGNOSIS — G43.109: Primary | ICD-10-CM

## 2019-09-11 PROCEDURE — 99283 EMERGENCY DEPT VISIT LOW MDM: CPT

## 2019-09-11 PROCEDURE — 96372 THER/PROPH/DIAG INJ SC/IM: CPT

## 2019-09-11 PROCEDURE — 99284 EMERGENCY DEPT VISIT MOD MDM: CPT

## 2019-09-11 NOTE — ED PHYSICIAN DOCUMENTATION
PD HPI HEADACHE





- Stated complaint


Stated Complaint: MIGRAINE





- Chief complaint


Chief Complaint: Neuro





- History obtained from


History obtained from: Patient, Family





- History of Present Illness


Timing - onset: Last night


Timing - onset during: Rest


Timing - duration: Hours


Timing - details: Gradual onset, Still present


Worst headache ever?: No: Worst headache ever?


Location: Front, Back


Quality: Throbbing


Associated symptoms: Nausea, Vision changes.  No: Fever, Stiff neck, Vomiting, 

Weakness, Numbness, Syncope, Seizure, Eye pain


Improved by: Rest, Dark room, Quiet


Worsened by: Light, Noise


Contributing factors: No: Anticoagulated


Similar symptoms before: Diagnosis (migraine)


Recently seen: Other (epidural steroid injection)





- Additional information


Additional information: 





48 y/o female with a history of migraine headache has developed a migraine last 

night with visual scotoma, nausea and pain. She had epidural steroid injection 

done yesterday with relief of her leg pain. This is her second round of 

injection and she has had improvement. She has prior experience with compazine 

causing her to feel that she needed to get up and leave and she has had reaction

to toradal as well.





Review of Systems


Constitutional: denies: Fever


Eyes: reports: Photophobia.  denies: Decreased vision


Ears: denies: Ear pain


Nose: denies: Rhinorrhea / runny nose, Congestion


Throat: denies: Sore throat


Cardiac: denies: Chest pain / pressure, Palpitations


Respiratory: denies: Dyspnea, Cough


GI: reports: Nausea.  denies: Abdominal Pain, Vomiting, Constipation, Diarrhea


: denies: Dysuria, Frequency


Skin: denies: Rash


Musculoskeletal: denies: Neck pain, Back pain, Extremity pain


Neurologic: reports: Headache.  denies: Generalized weakness, Focal weakness, 

Numbness, Head injury, LOC





PD PAST MEDICAL HISTORY





- Past Medical History


Past Medical History: Yes


Cardiovascular: None


Respiratory: None


Neuro: Migraines, Seizure disorder


Endocrine/Autoimmune: None


GI: Ulcers


GYN: Endometriosis


: None


HEENT: None


Psych: None


Musculoskeletal: Chronic back pain


Derm: None


Other Past Medical History: hep c, states "got rid of that"





- Past Surgical History


Past Surgical History: Yes


General: Cholecystectomy, Hiatal hernia repair


/GYN:  section, Endometrial ablation, Dilation and currettage, 

Hysterectomy, Oophrectomy





- Present Medications


Home Medications: 


                                Ambulatory Orders











 Medication  Instructions  Recorded  Confirmed


 


Hydrocodone/Acetaminophen 1 - 2 each PO Q6H PRN #8 tablet 18 





[Hydrocodon-Acetaminophen 5-325]   


 


Ibuprofen [Motrin] 800 mg PO Q8H PRN #30 tablet 18 


 


Hydrocodone/Acetaminophen 1 - 2 each PO Q6H PRN #7 tablet 10/29/18 





[Hydrocodon-Acetaminophen 5-325]   


 


predniSONE [Prednisone] 60 mg PO DAILY 5 Days #15 tablet 10/29/18 


 


Doxycycline Hyclate 100 mg PO BID #20 capsule 18 


 


Hydrocodone/Acetaminophen 1 - 2 each PO Q6H PRN #14 tablet 18 





[Hydrocodon-Acetaminophen 5-325]   


 


Albuterol Sulf [Ventolin Hfa 1 - 2 puffs INH Q4HR PRN #1 inhaler 19 





Inhaler]   


 


Amox/Clav 875/125 [Augmentin] 1 each PO Q12H #20 tablet 19 


 


guaiFENesin/CODEINE [Robitussin AC] 5 - 10 ml PO Q6H PRN #120 ml 19 














- Allergies


Allergies/Adverse Reactions: 


                                    Allergies











Allergy/AdvReac Type Severity Reaction Status Date / Time


 


ketorolac [From Toradol] AdvReac  Unknown Verified 18 11:10


 


metronidazole [From Flagyl] AdvReac  Hives Verified 18 11:10


 


Penicillins AdvReac  Nausea Verified 18 11:10


 


prochlorperazine AdvReac  Unknown Verified 18 11:10





[From Compazine]     


 


sumatriptan [From Imitrex] AdvReac  Nausea Verified 18 11:10














- Social History


Does the pt smoke?: Yes


Smoking Status: Current every day smoker


Does the pt drink ETOH?: No


Does the pt have substance abuse?: No


Substance Use and Type: Marijuana





- Immunizations


Immunizations are current?: Yes


Immunizations: TDAP current <10years





- POLST


Patient has POLST: No





PD ED PE NORMAL





- Vitals


Vital signs reviewed: Yes (hypertensive diastolic )





- General


General: Alert and oriented X 3, Well developed/nourished, Other (appears to be 

in pain with  tone and flat affect. )





- HEENT


HEENT: Atraumatic, PERRL, EOMI, Ears normal, Moist mucous membranes, Pharynx 

benign, Dentition benign





- Neck


Neck: Supple, no meningeal sign, No bony TTP





- Cardiac


Cardiac: RRR, No murmur





- Respiratory


Respiratory: No respiratory distress, Clear bilaterally





- Abdomen


Abdomen: Normal bowel sounds, Soft, Non tender, Non distended, No organomegaly





- Back


Back: No CVA TTP, No spinal TTP





- Derm


Derm: Normal color, Warm and dry, No rash





- Extremities


Extremities: No deformity, No edema





- Neuro


Neuro: Alert and oriented X 3, CNs 2-12 intact, No motor deficit, No sensory 

deficit, Normal speech


Eye Opening: Spontaneous


Motor: Obeys Commands


Verbal: Oriented


GCS Score: 15





- Psych


Psych: Normal mood, Normal affect





Results





- Vitals


Vitals: 


                               Vital Signs - 24 hr











  19





  06:48 08:30


 


Temperature 36.7 C 


 


Heart Rate 94 82


 


Respiratory 20 16





Rate  


 


Blood Pressure 126/100 H 121/81 H


 


O2 Saturation 100 








                                     Oxygen











O2 Source                      Room air

















PD MEDICAL DECISION MAKING





- ED course


Complexity details: reviewed results, re-evaluated patient, considered 

differential, d/w patient


ED course: 





48 y/o female with migraine has multiple migraine medication reactions and a 

migraine. We were not able to establish IV access and the patient was 

administered IM dialudid and phenergan. 





Departure





- Departure


Disposition: 01 Home, Self Care


Clinical Impression: 


Migraine


Qualifiers:


 Migraine type: with aura Status migrainosus presence: without status 

migrainosus Intractability: not intractable Qualified Code(s): G43.109 - 

Migraine with aura, not intractable, without status migrainosus





Condition: Stable


Instructions:  ED Headache Migraine


Follow-Up: 


ELLA BECK ARNP [Physician No Access] -

## 2019-10-03 ENCOUNTER — HOSPITAL ENCOUNTER (EMERGENCY)
Dept: HOSPITAL 76 - ED | Age: 49
Discharge: HOME | End: 2019-10-03
Payer: MEDICAID

## 2019-10-03 VITALS — SYSTOLIC BLOOD PRESSURE: 125 MMHG | DIASTOLIC BLOOD PRESSURE: 110 MMHG

## 2019-10-03 DIAGNOSIS — F17.200: Primary | ICD-10-CM

## 2019-10-03 DIAGNOSIS — R03.0: ICD-10-CM

## 2019-10-03 DIAGNOSIS — S62.654A: ICD-10-CM
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
## 2019-10-03 PROCEDURE — 73140 X-RAY EXAM OF FINGER(S): CPT

## 2019-10-03 PROCEDURE — 99283 EMERGENCY DEPT VISIT LOW MDM: CPT

## 2019-10-03 NOTE — XRAY REPORT
Reason:  4th finger inj

Procedure Date:  10/03/2019   

Accession Number:  [id number] / [id number]                    

Procedure:  XR  - Finger(s) RT CPT Code:  

 

FULL RESULT:

 

 

EXAM:

RIGHT FOURTH DIGIT RADIOGRAPHY

 

EXAM DATE: 10/3/2019 07:54 PM.

 

CLINICAL HISTORY: 4th finger injury.

 

COMPARISON: None.

 

TECHNIQUE: 3 views.

 

FINDINGS:

Bones: Oblique mildly displaced fracture through the proximal to distal 

portion of the middle phalanx of the right ring finger. No other fracture 

seen.

 

Joints: Normal. No subluxations.

 

Soft Tissues: Soft tissue swelling.

IMPRESSION: Oblique mildly displaced fracture through the proximal to 

distal portion of the middle phalanx of the right ring finger.

 

[facility]

## 2019-10-03 NOTE — ED PHYSICIAN DOCUMENTATION
PD HPI UPPER EXT INJURY





- Stated complaint


Stated Complaint: R FINGER INJ





- Chief complaint


Chief Complaint: Trauma Ext





- History obtained from


History obtained from: Patient





- History of Present Illness


Location: Right (Her right fourth finger got caught in something and heard a 

snap)


Where injury occurred: Home


Timing - onset: Today





Review of Systems


Constitutional: reports: Reviewed and negative


Cardiac: reports: Reviewed and negative


Respiratory: reports: Reviewed and negative





PD PAST MEDICAL HISTORY





- Past Medical History


Past Medical History: Yes


Cardiovascular: None


Respiratory: None


Neuro: Migraines, Seizure disorder


Endocrine/Autoimmune: None


GI: Ulcers


GYN: Endometriosis


: None


HEENT: None


Psych: None


Musculoskeletal: Chronic back pain


Derm: None





- Past Surgical History


Past Surgical History: Yes


General: Cholecystectomy, Hiatal hernia repair


/GYN:  section, Endometrial ablation, Dilation and currettage, 

Hysterectomy, Oophrectomy





- Present Medications


Home Medications: 


                                Ambulatory Orders











 Medication  Instructions  Recorded  Confirmed


 


Hydrocodone/Acetaminophen 1 - 2 each PO Q6H PRN #8 tablet 18 





[Hydrocodon-Acetaminophen 5-325]   


 


Ibuprofen [Motrin] 800 mg PO Q8H PRN #30 tablet 18 


 


Hydrocodone/Acetaminophen 1 - 2 each PO Q6H PRN #7 tablet 10/29/18 





[Hydrocodon-Acetaminophen 5-325]   


 


predniSONE [Prednisone] 60 mg PO DAILY 5 Days #15 tablet 10/29/18 


 


Doxycycline Hyclate 100 mg PO BID #20 capsule 18 


 


Hydrocodone/Acetaminophen 1 - 2 each PO Q6H PRN #14 tablet 18 





[Hydrocodon-Acetaminophen 5-325]   


 


Albuterol Sulf [Ventolin Hfa 1 - 2 puffs INH Q4HR PRN #1 inhaler 19 





Inhaler]   


 


Amox/Clav 875/125 [Augmentin] 1 each PO Q12H #20 tablet 19 


 


guaiFENesin/CODEINE [Robitussin AC] 5 - 10 ml PO Q6H PRN #120 ml 19 


 


Hydrocodone/Acetaminophen 1 - 2 each PO Q6H PRN #14 tablet 10/03/19 





[Hydrocodon-Acetaminophen 5-325]   














- Allergies


Allergies/Adverse Reactions: 


                                    Allergies











Allergy/AdvReac Type Severity Reaction Status Date / Time


 


ketorolac [From Toradol] AdvReac  Unknown Verified 10/03/19 19:20


 


metronidazole [From Flagyl] AdvReac  Hives Verified 10/03/19 19:20


 


Penicillins AdvReac  Nausea Verified 10/03/19 19:20


 


prochlorperazine AdvReac  Unknown Verified 10/03/19 19:20





[From Compazine]     


 


sumatriptan [From Imitrex] AdvReac  Nausea Verified 10/03/19 19:20














- Social History


Does the pt smoke?: Yes


Smoking Status: Current every day smoker


Does the pt drink ETOH?: No


Does the pt have substance abuse?: No





- Immunizations


Immunizations are current?: Yes


Immunizations: TDAP current <10years





- POLST


Patient has POLST: No





PD ED PE NORMAL





- Vitals


Vital signs reviewed: Yes





- General


General: Alert and oriented X 3, No acute distress





- Extremities


Extremities: Other (Tender about the middle phalanx of the right fourth finger 

without obvious deformity.  Neurovascular intact at the tip.)





- Neuro


Neuro: Alert and oriented X 3, Normal speech





Results





- Vitals


Vitals: 


                               Vital Signs - 24 hr











  10/03/19





  19:18


 


Temperature 36.2 C L


 


Heart Rate 105 H


 


Respiratory 18





Rate 


 


Blood Pressure 125/110 H


 


O2 Saturation 99








                                     Oxygen











O2 Source                      Room air

















- Rads (name of study)


  ** R 4th finger


Radiology: EMP read contemporaneously (Oblique fracture of the middle phalanx of

the fourth finger)





Procedures





- Splint (location)


  ** R 4th finger


Splint applied by: Physician


Type of splint: Metal foam finger splint


Other: Patient tolerated well, No complications, Neurovascular intact





Departure





- Departure


Disposition: 01 Home, Self Care


Clinical Impression: 


Finger fracture, right


Qualifiers:


 Encounter type: initial encounter Finger: ring finger Fracture type: closed 

Phalanx: middle Fracture alignment: nondisplaced Qualified Code(s): S62.654A - 

Nondisplaced fracture of middle phalanx of right ring finger, initial encounter 

for closed fracture





Condition: Good


Record reviewed to determine appropriate education?: Yes


Instructions:  ED Fx Finger Closed


Follow-Up: 


[facility] [Provider Group] - Within 1 week


Prescriptions: 


Hydrocodone/Acetaminophen [Hydrocodon-Acetaminophen 5-325] 1 - 2 each PO Q6H PRN

#14 tablet


 PRN Reason: pain


Comments: 


Keep the splint on and dry, do not remove it except for very briefly for 

washing.  Keep it on at all times otherwise.  Follow-up with the orthopedic 

surgeon for recheck within the week, call their office tomorrow Monday for an 

appointment.





Do not drink or drive while taking narcotic pain medication.


Note that many narcotic pain relievers also contain Tylenol/acetaminophen.  

Please ensure that your total dose of acetaminophen from all sources does not 

exceed 3 g (3000 mg) per day.


You may get constipated while on this medication.  Take a stool softener such as

Colace twice a day while you are on it.  Also add an over-the-counter laxative 

such as senna or MiraLAX on any day that you do not have a bowel movement.


If you received a narcotic pain medication or sedative while in the emergency 

department, do not drive for the next 24 hours.





Your blood pressure was elevated today on check into the emergency department.  

This does not mean that you have hypertension, it is a common phenomenon to come

to the emergency department and have elevated blood pressure.  I recommend that 

you see your primary care physician within the week to have it rechecked when 

you are feeling better.

## 2020-02-13 ENCOUNTER — HOSPITAL ENCOUNTER (OUTPATIENT)
Dept: HOSPITAL 76 - LAB.S | Age: 50
Discharge: HOME | End: 2020-02-13
Attending: INTERNAL MEDICINE
Payer: MEDICAID

## 2020-02-13 DIAGNOSIS — R11.0: Primary | ICD-10-CM

## 2020-02-13 DIAGNOSIS — Z87.19: ICD-10-CM

## 2020-02-13 DIAGNOSIS — N95.1: ICD-10-CM

## 2020-02-13 LAB
ALBUMIN DIAFP-MCNC: 4.3 G/DL (ref 3.2–5.5)
ALBUMIN/GLOB SERPL: 1.4 {RATIO} (ref 1–2.2)
ALP SERPL-CCNC: 73 IU/L (ref 42–121)
ALT SERPL W P-5'-P-CCNC: 22 IU/L (ref 10–60)
ANION GAP SERPL CALCULATED.4IONS-SCNC: 9 MMOL/L (ref 6–13)
AST SERPL W P-5'-P-CCNC: 21 IU/L (ref 10–42)
BILIRUB BLD-MCNC: 0.7 MG/DL (ref 0.2–1)
BUN SERPL-MCNC: 7 MG/DL (ref 6–20)
CALCIUM UR-MCNC: 9.3 MG/DL (ref 8.5–10.3)
CHLORIDE SERPL-SCNC: 102 MMOL/L (ref 101–111)
CO2 SERPL-SCNC: 25 MMOL/L (ref 21–32)
CREAT SERPLBLD-SCNC: 0.6 MG/DL (ref 0.4–1)
GFRSERPLBLD MDRD-ARVRAT: 106 ML/MIN/{1.73_M2} (ref 89–?)
GLOBULIN SER-MCNC: 3.1 G/DL (ref 2.1–4.2)
GLUCOSE SERPL-MCNC: 133 MG/DL (ref 70–100)
PROT SPEC-MCNC: 7.4 G/DL (ref 6.7–8.2)
SODIUM SERPLBLD-SCNC: 136 MMOL/L (ref 135–145)

## 2020-02-13 PROCEDURE — 85651 RBC SED RATE NONAUTOMATED: CPT

## 2020-02-13 PROCEDURE — 85025 COMPLETE CBC W/AUTO DIFF WBC: CPT

## 2020-02-13 PROCEDURE — 84443 ASSAY THYROID STIM HORMONE: CPT

## 2020-02-13 PROCEDURE — 80053 COMPREHEN METABOLIC PANEL: CPT

## 2020-02-13 PROCEDURE — 87522 HEPATITIS C REVRS TRNSCRPJ: CPT

## 2020-02-13 PROCEDURE — 36415 COLL VENOUS BLD VENIPUNCTURE: CPT

## 2020-07-15 NOTE — XRAY REPORT
Reason:  THORACIC SPINE   NECK PAIN

Procedure Date:  06/06/2019   

Accession Number:  [id number] / [id number]                    

Procedure:  XR  - Cervical Spine 2 View CPT Code:  

 

FULL RESULT:

 

 

EXAM:

CERVICAL SPINE RADIOGRAPHY

 

EXAM DATE: 6/6/2019 04:52 PM.

 

CLINICAL HISTORY: Thoracic spine, neck pain.

 

COMPARISONS: None.

 

TECHNIQUE: 3 views.

 

FINDINGS:

Alignment: Normal. No spondylolisthesis or scoliosis.

 

Bones: The cervical vertebral bodies and posterior elements are well 

visualized from the skull base through C7. No fractures or bone lesions.

 

Disks: Mild loss of disk space height is seen at C5-C6 with marginal 

anterior osteophytosis.

 

Facets: There is mild facet arthropathy with lateral mass hypertrophy 

most pronounced from C4 to C6.

 

Soft Tissues: Normal. No prevertebral soft tissue swelling. The 

visualized lung apices are clear.

IMPRESSION: Mild degenerative changes.

 

[facility] Unable to assess due to medical condition

## 2020-10-12 ENCOUNTER — HOSPITAL ENCOUNTER (EMERGENCY)
Dept: HOSPITAL 76 - ED | Age: 50
Discharge: HOME | End: 2020-10-12
Payer: MEDICAID

## 2020-10-12 VITALS — DIASTOLIC BLOOD PRESSURE: 75 MMHG | SYSTOLIC BLOOD PRESSURE: 141 MMHG

## 2020-10-12 DIAGNOSIS — N76.4: Primary | ICD-10-CM

## 2020-10-12 DIAGNOSIS — F17.200: ICD-10-CM

## 2020-10-12 PROCEDURE — 99283 EMERGENCY DEPT VISIT LOW MDM: CPT

## 2020-10-12 PROCEDURE — 56405 I&D VULVA/PERINEAL ABSCESS: CPT

## 2020-10-12 PROCEDURE — 96372 THER/PROPH/DIAG INJ SC/IM: CPT

## 2020-10-12 NOTE — ED PHYSICIAN DOCUMENTATION
History of Present Illness





- Stated complaint


Stated Complaint: FEMALE 





- History obtained from


History obtained from: Patient





- Additonal information


Additional information: 





Worsening abscess in the left groin for about a week.  She saw her doctor and 

was put on doxycycline but it continues to worsen.  No history of MRSA.





Review of Systems


Constitutional: reports: Reviewed and negative


Nose: reports: Reviewed and negative


Cardiac: reports: Reviewed and negative


Respiratory: reports: Reviewed and negative





PD PAST MEDICAL HISTORY





- Past Medical History


Cardiovascular: None


Respiratory: None


Neuro: Migraines, Seizure disorder


Endocrine/Autoimmune: None


GI: Ulcers


GYN: Endometriosis


: None


HEENT: None


Psych: None


Musculoskeletal: Chronic back pain


Derm: None





- Past Surgical History


Past Surgical History: Yes


General: Cholecystectomy, Hiatal hernia repair


/GYN:  section, Endometrial ablation, Dilation and currettage, 

Hysterectomy, Oophrectomy





- Present Medications


Home Medications: 


                                Ambulatory Orders











 Medication  Instructions  Recorded  Confirmed


 


Hydrocodone/Acetaminophen 1 - 2 each PO Q6H PRN #8 tablet 18 





[Hydrocodon-Acetaminophen 5-325]   


 


Ibuprofen [Motrin] 800 mg PO Q8H PRN #30 tablet 18 


 


Hydrocodone/Acetaminophen 1 - 2 each PO Q6H PRN #7 tablet 10/29/18 





[Hydrocodon-Acetaminophen 5-325]   


 


predniSONE [Prednisone] 60 mg PO DAILY 5 Days #15 tablet 10/29/18 


 


Doxycycline Hyclate 100 mg PO BID #20 capsule 18 


 


Hydrocodone/Acetaminophen 1 - 2 each PO Q6H PRN #14 tablet 18 





[Hydrocodon-Acetaminophen 5-325]   


 


Albuterol Sulf [Ventolin Hfa 1 - 2 puffs INH Q4HR PRN #1 inhaler 19 





Inhaler]   


 


Amox/Clav 875/125 [Augmentin] 1 each PO Q12H #20 tablet 19 


 


guaiFENesin/CODEINE [Robitussin AC] 5 - 10 ml PO Q6H PRN #120 ml 19 


 


Hydrocodone/Acetaminophen 1 - 2 each PO Q6H PRN #14 tablet 10/03/19 





[Hydrocodon-Acetaminophen 5-325]   


 


Hydrocodone/Acetaminophen 1 - 2 tab PO Q6H PRN #10 tablet 10/12/20 





[Hydrocodone-Acetamin 5-325 mg]   














- Allergies


Allergies/Adverse Reactions: 


                                    Allergies











Allergy/AdvReac Type Severity Reaction Status Date / Time


 


ketorolac [From Toradol] AdvReac  Unknown Verified 10/03/19 19:20


 


metronidazole [From Flagyl] AdvReac  Hives Verified 10/03/19 19:20


 


Penicillins AdvReac  Nausea Verified 10/03/19 19:20


 


prochlorperazine AdvReac  Unknown Verified 10/03/19 19:20





[From Compazine]     


 


sumatriptan [From Imitrex] AdvReac  Nausea Verified 10/03/19 19:20














- Social History


Does the pt smoke?: Yes


Smoking Status: Current every day smoker


Does the pt drink ETOH?: No


Does the pt have substance abuse?: No





- Immunizations


Immunizations are current?: Yes


Immunizations: TDAP current <10years





- POLST


Patient has POLST: No





PD ED PE NORMAL





- Vitals


Vital signs reviewed: Yes





- General


General: Alert and oriented X 3, No acute distress





- Female 


Female : Chaperone present (Jeannette HARRELL), Other (She is a pointed abscess or 

sebaceous cyst to the left of the labia majora posteriorly.  Not a Bartholin's. 

No overlying cellulitis.)





- Neuro


Neuro: Alert and oriented X 3, Normal speech





Results





- Vitals


Vitals: 


                               Vital Signs - 24 hr











  10/12/20 10/12/20





  17:50 18:09


 


Temperature 36.3 C L 36.3 C L


 


Heart Rate 98 98


 


Respiratory 16 16





Rate  


 


Blood Pressure 144/77 H 144/77 H


 


O2 Saturation 95 97








                                     Oxygen











O2 Source                      Room air

















Procedures





- Abscess I&D (location)


  ** L groin


Preparation: Alcohol, Lidocaine 1%


Incision: Incised with scalpel, Purulent drainage, Loculations broken


Other: Pt tolerated well





Departure





- Departure


Disposition: 01 Home, Self Care


Clinical Impression: 


 Labial abscess





Condition: Good


Record reviewed to determine appropriate education?: Yes


Instructions:  ED Abscess IandD


Prescriptions: 


Hydrocodone/Acetaminophen [Hydrocodone-Acetamin 5-325 mg] 1 - 2 tab PO Q6H PRN 

#10 tablet


 PRN Reason: Pain


Comments: 


Return if you worsen or if it reaccumulates.

## 2020-10-15 ENCOUNTER — HOSPITAL ENCOUNTER (EMERGENCY)
Dept: HOSPITAL 76 - ED | Age: 50
Discharge: HOME | End: 2020-10-15
Payer: MEDICAID

## 2020-10-15 VITALS — DIASTOLIC BLOOD PRESSURE: 73 MMHG | SYSTOLIC BLOOD PRESSURE: 143 MMHG

## 2020-10-15 DIAGNOSIS — F17.200: ICD-10-CM

## 2020-10-15 DIAGNOSIS — N75.0: Primary | ICD-10-CM

## 2020-10-15 PROCEDURE — 99283 EMERGENCY DEPT VISIT LOW MDM: CPT

## 2020-10-15 PROCEDURE — 99281 EMR DPT VST MAYX REQ PHY/QHP: CPT

## 2020-10-15 NOTE — ED PHYSICIAN DOCUMENTATION
History of Present Illness





- Stated complaint


Stated Complaint: F 





- Chief complaint


Chief Complaint: Wound





- Additonal information


Additional information: 


50-year-old female presents to the emergency department for evaluation of pain 

in her left lower groin area.  She underwent an incision and drainage of an 

abscess of the labia majora about 2 days ago.  Symptoms began with swelling in 

the genital area 1 week ago and her primary care provider put her on 

doxycycline.





Since initially being diagnosed 1 week ago she has been doing daily sitz baths. 

She has been compliant with her antibiotics and is taking hydrocodone for pain. 

Because she cannot visualize the area and it remains tender she is worried that 

the infection is worsening.  She denies fevers abdominal pain vomiting or 

vaginal discharge.  No dysuria








Review of Systems


Constitutional: reports: Reviewed and negative


Ears: reports: Reviewed and negative


Nose: reports: Reviewed and negative


Throat: reports: Reviewed and negative


Cardiac: reports: Reviewed and negative


Respiratory: reports: Reviewed and negative


: reports: Other


Skin: reports: Rash, Lesions (Swollen labia majora left side), Abrasion (s), 

Laceration (s), Bite / sting, Reviewed and negative, Other


Musculoskeletal: reports: Reviewed and negative


Neurologic: reports: Reviewed and negative


Psychiatric: reports: Reviewed and negative





PD PAST MEDICAL HISTORY





- Past Medical History


Past Medical History: Yes


Cardiovascular: None


Respiratory: None


Neuro: Migraines, Seizure disorder


Endocrine/Autoimmune: None


GI: Ulcers


GYN: Endometriosis


: None


HEENT: None


Psych: None


Musculoskeletal: Chronic back pain


Derm: None





- Past Surgical History


Past Surgical History: Yes


General: Cholecystectomy, Hiatal hernia repair


/GYN:  section, Endometrial ablation, Dilation and currettage, 

Hysterectomy, Oophrectomy





- Present Medications


Home Medications: 


                                Ambulatory Orders











 Medication  Instructions  Recorded  Confirmed


 


Hydrocodone/Acetaminophen 1 - 2 each PO Q6H PRN #8 tablet 18 





[Hydrocodon-Acetaminophen 5-325]   


 


Ibuprofen [Motrin] 800 mg PO Q8H PRN #30 tablet 18 


 


Hydrocodone/Acetaminophen 1 - 2 each PO Q6H PRN #7 tablet 10/29/18 





[Hydrocodon-Acetaminophen 5-325]   


 


predniSONE [Prednisone] 60 mg PO DAILY 5 Days #15 tablet 10/29/18 


 


Doxycycline Hyclate 100 mg PO BID #20 capsule 18 


 


Hydrocodone/Acetaminophen 1 - 2 each PO Q6H PRN #14 tablet 18 





[Hydrocodon-Acetaminophen 5-325]   


 


Albuterol Sulf [Ventolin Hfa 1 - 2 puffs INH Q4HR PRN #1 inhaler 19 





Inhaler]   


 


Amox/Clav 875/125 [Augmentin] 1 each PO Q12H #20 tablet 19 


 


guaiFENesin/CODEINE [Robitussin AC] 5 - 10 ml PO Q6H PRN #120 ml 19 


 


Hydrocodone/Acetaminophen 1 - 2 each PO Q6H PRN #14 tablet 10/03/19 





[Hydrocodon-Acetaminophen 5-325]   


 


Hydrocodone/Acetaminophen 1 - 2 tab PO Q6H PRN #10 tablet 10/12/20 





[Hydrocodone-Acetamin 5-325 mg]   














- Allergies


Allergies/Adverse Reactions: 


                                    Allergies











Allergy/AdvReac Type Severity Reaction Status Date / Time


 


ketorolac [From Toradol] AdvReac  Unknown Verified 10/15/20 17:48


 


metronidazole [From Flagyl] AdvReac  Hives Verified 10/15/20 17:48


 


Penicillins AdvReac  Nausea Verified 10/15/20 17:48


 


prochlorperazine AdvReac  Unknown Verified 10/15/20 17:48





[From Compazine]     


 


sumatriptan [From Imitrex] AdvReac  Nausea Verified 10/15/20 17:48














- Social History


Does the pt smoke?: Yes


Smoking Status: Current every day smoker


Does the pt drink ETOH?: No


Does the pt have substance abuse?: No





- Immunizations


Immunizations are current?: Yes


Immunizations: TDAP current <10years





- POLST


Patient has POLST: No





PD ED PE EXPANDED





- General


General: Alert, No acute distress





- Female 


Female : Other (Small 0.5 cm incision at the base of the left labia majora 

with no surrounding erythema or swelling.  There is a small amount of ecchymosis

that extends to the buttock.  No induration.  No drainage.)





Results





- Vitals


Vitals: 





                               Vital Signs - 24 hr











  10/15/20





  17:44


 


Temperature 37 C


 


Heart Rate 76


 


Respiratory 17





Rate 


 


Blood Pressure 142/65 H


 


O2 Saturation 96








                                     Oxygen











O2 Source                      Room air

















PD MEDICAL DECISION MAKING





- ED course


Complexity details: d/w patient


ED course: 


50-year-old female return to the emergency department for evaluation of the 

incision site of her recent Bartholin gland abscess.  The incision site appears 

clean with no further drainage.  There is no swelling erythema or induration.  

It does remain tender but I think this is associated with the small amount of 

bruising and likely deeper inflammation.  She is completing the course of 

antibiotics tonight.  I recommend that she continue with warm sits baths.  

Emergent return precautions discussed








Departure





- Departure


Disposition: 01 Home, Self Care


Clinical Impression: 


 Bartholin cyst





Condition: Stable


Record reviewed to determine appropriate education?: Yes


Comments: 


The incision appears to be healing well.  Please finish your antibiotics.  You 

will likely remain tender in this area for the next 4 to 7 days.  I do recommend

that you continue the sitz baths.  If at any point you have increased pain, 

milky drainage from the incision, develop fevers please return to the emergency 

department for a second look

## 2021-04-12 ENCOUNTER — HOSPITAL ENCOUNTER (EMERGENCY)
Dept: HOSPITAL 76 - ED | Age: 51
Discharge: HOME | End: 2021-04-12
Payer: MEDICAID

## 2021-04-12 VITALS — SYSTOLIC BLOOD PRESSURE: 144 MMHG | DIASTOLIC BLOOD PRESSURE: 82 MMHG

## 2021-04-12 DIAGNOSIS — L02.32: ICD-10-CM

## 2021-04-12 DIAGNOSIS — F17.200: ICD-10-CM

## 2021-04-12 DIAGNOSIS — L02.31: Primary | ICD-10-CM

## 2021-04-12 PROCEDURE — 10060 I&D ABSCESS SIMPLE/SINGLE: CPT

## 2021-04-12 PROCEDURE — 87205 SMEAR GRAM STAIN: CPT

## 2021-04-12 PROCEDURE — 87181 SC STD AGAR DILUTION PER AGT: CPT

## 2021-04-12 PROCEDURE — 87070 CULTURE OTHR SPECIMN AEROBIC: CPT

## 2021-04-12 PROCEDURE — 87077 CULTURE AEROBIC IDENTIFY: CPT

## 2021-04-12 NOTE — EXTERNAL MEDICAL SUMMARY RPT
Continuity of Care Document

                            Created on:2021



Patient:BRIDGET CHAWLA

Sex:Female

:1970

External Reference #:[id number]





Demographics







                          Phone                     Unavailable

 

                          Preferred Language        Unknown

 

                          Marital Status            Unknown

 

                          Mosque Affiliation     Unknown

 

                          Race                      Unknown

 

                          Ethnic Group              Unknown









Author







                          Organization              [facility]

 

                          [place]                    [place] [address]

 

                          Phone                     [phone]









Social History







                     date                description         facility

 

                     [id number]

## 2021-04-12 NOTE — ED PHYSICIAN DOCUMENTATION
PD HPI SKIN





- Stated complaint


Stated Complaint: FEMALE 





- Chief complaint


Chief Complaint: Wound





- History obtained from


History obtained from: Patient





- History of Present Illness


Timing - onset: How many days ago (3)


Timing - duration: Days (3)


Timing - details: Gradual onset, Still present


Location: Genitals


Quality / character: Painful, Discolored, Raised, Draining


Improved by: Antibiotics


Worsened by (comment): COMMENT (pressure it's very tender)


Contributing factors: Other (has a history of boils)


Similar symptoms before: Diagnosis (abscess and bartholins duct abscess)


Recently seen: Not recently seen





- Additional information


Additional information: 





50-year-old female with prior history of Bartholin's duct abscess has developed 

an abscess again in her perineum this is not in the Bartholin's gland.  This is 

along the gluteal fold.  She has started on some antibiotic and despite this she

had increased swelling and while she was in the waiting room the boil began to 

drain.  It is still very painful and there is only a slight amount of drainage 

from it.





Review of Systems


Constitutional: denies: Fever


Ears: denies: Ear pain


Nose: denies: Congestion


Throat: denies: Sore throat


Respiratory: denies: Cough


GI: denies: Vomiting





PD PAST MEDICAL HISTORY





- Past Medical History


Past Medical History: Yes


Cardiovascular: None


Respiratory: None


Neuro: Migraines, Seizure disorder


Endocrine/Autoimmune: None


GI: Ulcers


GYN: Endometriosis


: None


HEENT: None


Psych: None


Musculoskeletal: Chronic back pain


Derm: None





- Past Surgical History


Past Surgical History: Yes


General: Cholecystectomy, Hiatal hernia repair


/GYN:  section, Endometrial ablation, Dilation and currettage, 

Hysterectomy, Oophrectomy





- Present Medications


Home Medications: 


                                Ambulatory Orders











 Medication  Instructions  Recorded  Confirmed


 


Doxycycline Hyclate 100 mg PO BID #20 capsule 18


 


Albuterol Sulf [Ventolin Hfa 1 - 2 puffs INH Q4HR PRN #1 inhaler 19





Inhaler]   


 


Hydrocodone/Acetaminophen 1 - 2 tab PO Q6H PRN #10 tablet 10/12/20 





[Hydrocodone-Acetamin 5-325 mg]   














- Allergies


Allergies/Adverse Reactions: 


                                    Allergies











Allergy/AdvReac Type Severity Reaction Status Date / Time


 


ketorolac [From Toradol] AdvReac  Unknown Verified 10/15/20 17:48


 


metronidazole [From Flagyl] AdvReac  Hives Verified 10/15/20 17:48


 


Penicillins AdvReac  Nausea Verified 10/15/20 17:48


 


prochlorperazine AdvReac  Unknown Verified 10/15/20 17:48





[From Compazine]     


 


sumatriptan [From Imitrex] AdvReac  Nausea Verified 10/15/20 17:48














- Social History


Does the pt smoke?: Yes


Smoking Status: Current every day smoker


Does the pt drink ETOH?: No


Does the pt have substance abuse?: No





- Immunizations


Immunizations are current?: Yes


Immunizations: TDAP current <10years





- POLST


Patient has POLST: No





PD ED PE NORMAL





- Vitals


Vital signs reviewed: Yes (Hypertensive)





- General


General: No acute distress, Well developed/nourished





- HEENT


HEENT: Atraumatic, PERRL, EOMI





- Neck


Neck: Supple, no meningeal sign, No bony TTP





- Respiratory


Respiratory: No respiratory distress





- Female 


Female : Chaperone present (Burt), Other (There is a tender erythematous 

mass in the gluteal fold below the vagina on the right side. The top is draining

and thick pus is expressed with pressure. )





- Derm


Derm: Normal color, Warm and dry, No rash





- Extremities


Extremities: No deformity, No edema





- Neuro


Neuro: Alert and oriented X 3, CNs 2-12 intact, No motor deficit, No sensory 

deficit, Normal speech


Eye Opening: Spontaneous


Motor: Obeys Commands


Verbal: Oriented


GCS Score: 15





- Psych


Psych: Normal mood, Normal affect





Results





- Vitals


Vitals: 


                               Vital Signs - 24 hr











  21





  21:49


 


Temperature 36.4 C L


 


Heart Rate 92


 


Respiratory 20





Rate 


 


Blood Pressure 149/84 H


 


O2 Saturation 99








                                     Oxygen











O2 Source                      Room air

















Procedures





- Abscess I&D (location)


  ** right gluteal fold


Preparation: Betadine, Lidocaine 1%


Incision: Incised with scalpel, Purulent drainage, Loculations broken, 

Irrigated, Culture obtained


Other: Pt tolerated well, Dressing applied, Antibiotic prescribed (ON diclox 

already)





PD MEDICAL DECISION MAKING





- ED course


Complexity details: considered differential, d/w patient


ED course: 





50-year-old female with a boil that is now spontaneously draining has only 

superficially unroofed and we have subsequently used some lidocaine and incised 

this deeper to drain and break up loculations.





Departure





- Departure


Disposition: 01 Home, Self Care


Clinical Impression: 


 Abscess





Condition: Stable


Instructions:  ED Abscess IandD


Follow-Up: 


[facility] [Provider Group]


Comments: 


Continue the dicloxacillin as previously prescribed.  When this abscess is 

completely healed follow-up with dermatology as recommended for potential 

excision of residual abscess cyst.

## 2021-07-19 ENCOUNTER — HOSPITAL ENCOUNTER (OUTPATIENT)
Dept: HOSPITAL 76 - DI.N | Age: 51
Discharge: HOME | End: 2021-07-19
Attending: ORTHOPAEDIC SURGERY
Payer: MEDICAID

## 2021-07-19 DIAGNOSIS — M67.441: Primary | ICD-10-CM

## 2021-07-19 NOTE — XRAY REPORT
PROCEDURE:  Hand 3 View RT

 

INDICATIONS:  RT HAND GANGLION CYST

 

TECHNIQUE:  3 views of the hand(s) acquired.  

 

COMPARISON: 10/3/2019.

 

FINDINGS:  

 

Bones:  No acute fractures or dislocations. Old healed fourth middle phalangeal shaft fracture is see
n. No suspicious bony lesions.  

 

Soft tissues:  No suspicious soft tissue calcifications.  

 

IMPRESSION:  

Old healed fourth middle phalangeal shaft fracture. No acute right hand fracture or dislocation. No s
uspicious intraosseous lesion. No gross soft tissue abnormality.

 

Reviewed by: Fransisco Lennon MD on 7/19/2021 12:31 PM PDT

Approved by: Fransisco Lennon MD on 7/19/2021 12:31 PM PDT

 

 

Station ID:  [id number]

## 2021-12-01 ENCOUNTER — HOSPITAL ENCOUNTER (EMERGENCY)
Dept: HOSPITAL 76 - ED | Age: 51
Discharge: HOME | End: 2021-12-01
Payer: MEDICAID

## 2021-12-01 VITALS — DIASTOLIC BLOOD PRESSURE: 72 MMHG | SYSTOLIC BLOOD PRESSURE: 135 MMHG

## 2021-12-01 DIAGNOSIS — R19.7: ICD-10-CM

## 2021-12-01 DIAGNOSIS — G89.29: ICD-10-CM

## 2021-12-01 DIAGNOSIS — R11.2: Primary | ICD-10-CM

## 2021-12-01 DIAGNOSIS — M54.9: ICD-10-CM

## 2021-12-01 DIAGNOSIS — F17.200: ICD-10-CM

## 2021-12-01 DIAGNOSIS — R10.84: ICD-10-CM

## 2021-12-01 LAB
ALBUMIN DIAFP-MCNC: 4.1 G/DL (ref 3.2–5.5)
ALBUMIN/GLOB SERPL: 1.4 {RATIO} (ref 1–2.2)
ALP SERPL-CCNC: 72 IU/L (ref 42–121)
ALT SERPL W P-5'-P-CCNC: 15 IU/L (ref 10–60)
ANION GAP SERPL CALCULATED.4IONS-SCNC: 9 MMOL/L (ref 6–13)
AST SERPL W P-5'-P-CCNC: 13 IU/L (ref 10–42)
BASOPHILS NFR BLD AUTO: 0 10^3/UL (ref 0–0.1)
BASOPHILS NFR BLD AUTO: 0.3 %
BILIRUB BLD-MCNC: 0.8 MG/DL (ref 0.2–1)
BUN SERPL-MCNC: 14 MG/DL (ref 6–20)
CALCIUM UR-MCNC: 9.1 MG/DL (ref 8.5–10.3)
CHLORIDE SERPL-SCNC: 102 MMOL/L (ref 101–111)
CO2 SERPL-SCNC: 26 MMOL/L (ref 21–32)
CREAT SERPLBLD-SCNC: 0.5 MG/DL (ref 0.4–1)
EOSINOPHIL # BLD AUTO: 0 10^3/UL (ref 0–0.7)
EOSINOPHIL NFR BLD AUTO: 0.1 %
ERYTHROCYTE [DISTWIDTH] IN BLOOD BY AUTOMATED COUNT: 12.8 % (ref 12–15)
GFRSERPLBLD MDRD-ARVRAT: 130 ML/MIN/{1.73_M2} (ref 89–?)
GLOBULIN SER-MCNC: 2.9 G/DL (ref 2.1–4.2)
GLUCOSE SERPL-MCNC: 126 MG/DL (ref 70–100)
HCT VFR BLD AUTO: 42.2 % (ref 37–47)
HGB UR QL STRIP: 14.8 G/DL (ref 12–16)
LIPASE SERPL-CCNC: 15 U/L (ref 22–51)
LYMPHOCYTES # SPEC AUTO: 0.9 10^3/UL (ref 1.5–3.5)
LYMPHOCYTES NFR BLD AUTO: 13.3 %
MCH RBC QN AUTO: 33.8 PG (ref 27–31)
MCHC RBC AUTO-ENTMCNC: 35.1 G/DL (ref 32–36)
MCV RBC AUTO: 96.3 FL (ref 81–99)
MONOCYTES # BLD AUTO: 0.3 10^3/UL (ref 0–1)
MONOCYTES NFR BLD AUTO: 4.4 %
NEUTROPHILS # BLD AUTO: 5.6 10^3/UL (ref 1.5–6.6)
NEUTROPHILS # SNV AUTO: 6.9 X10^3/UL (ref 4.8–10.8)
NEUTROPHILS NFR BLD AUTO: 81.6 %
NRBC # BLD AUTO: 0 /100WBC
NRBC # BLD AUTO: 0 X10^3/UL
PDW BLD AUTO: 11.5 FL (ref 7.9–10.8)
PLATELET # BLD: 145 10^3/UL (ref 130–450)
POTASSIUM SERPL-SCNC: 3.7 MMOL/L (ref 3.5–5)
PROT SPEC-MCNC: 7 G/DL (ref 6.7–8.2)
RBC MAR: 4.38 10^6/UL (ref 4.2–5.4)
SODIUM SERPLBLD-SCNC: 137 MMOL/L (ref 135–145)

## 2021-12-01 PROCEDURE — 36415 COLL VENOUS BLD VENIPUNCTURE: CPT

## 2021-12-01 PROCEDURE — 83690 ASSAY OF LIPASE: CPT

## 2021-12-01 PROCEDURE — 80053 COMPREHEN METABOLIC PANEL: CPT

## 2021-12-01 PROCEDURE — 96374 THER/PROPH/DIAG INJ IV PUSH: CPT

## 2021-12-01 PROCEDURE — 99283 EMERGENCY DEPT VISIT LOW MDM: CPT

## 2021-12-01 PROCEDURE — 85025 COMPLETE CBC W/AUTO DIFF WBC: CPT

## 2021-12-01 PROCEDURE — 96361 HYDRATE IV INFUSION ADD-ON: CPT

## 2021-12-01 PROCEDURE — 96372 THER/PROPH/DIAG INJ SC/IM: CPT

## 2021-12-01 NOTE — ED PHYSICIAN DOCUMENTATION
PD HPI NVD





- Stated complaint


Stated Complaint: VOMITING/DIARREHA





- Chief complaint


Chief Complaint: Abd Pain





- History obtained from


History obtained from: Patient





- History of Present Illness


Timing - onset: Today


Timing - duration: Days (1)


Timing - details: Gradual onset


Pain level max: 7


Pain level now: 5


Associated symptoms: Abdominal pain (crampy, diffuse).  No: Fever, Hematemesis, 

Melena, Hematochezia, Dizzy, Near syncope / syncope, Loss of appetite


Contributing factors: No: Sick contact


Improved by: Vomiting.  No: Eating, Laying still


Worsened by: No: Eating, Moving, Breathing, Position, Palpation





- Additonal information


Additional information: 





51-year-old female with vomiting and diarrhea today.  She states this started 

about 3 AM.  She states she has been unable to keep her chronic pain medication 

down for her back. Fevers.  No chills.  No blood in the vomit or the stool.  

Abdominal pain is diffuse, crampy.





Review of Systems


Constitutional: denies: Fever, Chills


Nose: denies: Rhinorrhea / runny nose, Congestion


Throat: denies: Sore throat


Cardiac: denies: Chest pain / pressure, Palpitations


Respiratory: denies: Cough


GI: reports: Vomiting, Diarrhea


Musculoskeletal: denies: Neck pain, Back pain


Neurologic: denies: Headache





PD PAST MEDICAL HISTORY





- Past Medical History


Cardiovascular: None


Respiratory: None


Neuro: Migraines, Seizure disorder


Endocrine/Autoimmune: None


GI: Ulcers


GYN: Endometriosis


: None


HEENT: None


Psych: None


Musculoskeletal: Chronic back pain


Derm: None





- Past Surgical History


Past Surgical History: Yes


General: Cholecystectomy, Hiatal hernia repair


/GYN:  section, Endometrial ablation, Dilation and currettage, 

Hysterectomy, Oophrectomy





- Present Medications


Home Medications: 


                                Ambulatory Orders











 Medication  Instructions  Recorded  Confirmed


 


Doxycycline Hyclate 100 mg PO BID #20 capsule 18


 


Albuterol Sulf [Ventolin Hfa 1 - 2 puffs INH Q4HR PRN #1 inhaler 19





Inhaler]   


 


Hydrocodone/Acetaminophen 1 - 2 tab PO Q6H PRN #10 tablet 10/12/20 





[Hydrocodone-Acetamin 5-325 mg]   


 


Ondansetron Odt [Zofran] 4 mg TL Q6H PRN #10 tablet 21 














- Allergies


Allergies/Adverse Reactions: 


                                    Allergies











Allergy/AdvReac Type Severity Reaction Status Date / Time


 


ketorolac [From Toradol] AdvReac  Unknown Verified 21 17:18


 


metronidazole [From Flagyl] AdvReac  Hives Verified 21 17:18


 


Penicillins AdvReac  Nausea Verified 21 17:18


 


prochlorperazine AdvReac  Unknown Verified 21 17:18





[From Compazine]     


 


sumatriptan [From Imitrex] AdvReac  Nausea Verified 21 17:18














- Social History


Does the pt smoke?: Yes


Smoking Status: Current every day smoker


Does the pt drink ETOH?: No


Does the pt have substance abuse?: No





- Immunizations


Immunizations are current?: Yes


Immunizations: TDAP current <10years





- POLST


Patient has POLST: No





PD ED PE NORMAL





- Vitals


Vital signs reviewed: Yes





- General


General: Alert and oriented X 3, No acute distress





- HEENT


HEENT: PERRL, Moist mucous membranes





- Neck


Neck: Supple, no meningeal sign





- Cardiac


Cardiac: RRR, Strong equal pulses





- Respiratory


Respiratory: No respiratory distress, Clear bilaterally





- Abdomen


Abdomen: Soft, Non tender, Non distended





- Back


Back: No CVA TTP, No spinal TTP





- Derm


Derm: Warm and dry





- Extremities


Extremities: No edema





- Neuro


Neuro: Alert and oriented X 3





- Psych


Psych: Normal mood, Normal affect





Results





- Vitals


Vitals: 


                               Vital Signs - 24 hr











  21





  17:16 20:04


 


Temperature 36.5 C 


 


Heart Rate 60 59 L


 


Respiratory 20 16





Rate  


 


Blood Pressure 132/79 H 135/72 H


 


O2 Saturation 97 94








                                     Oxygen











O2 Source                      Room air

















- Labs


Labs: 


                                Laboratory Tests











  21





  18:02 18:02


 


WBC  6.9 


 


RBC  4.38 


 


Hgb  14.8 


 


Hct  42.2 


 


MCV  96.3 


 


MCH  33.8 H 


 


MCHC  35.1 


 


RDW  12.8 


 


Plt Count  145 


 


MPV  11.5 H 


 


Neut # (Auto)  5.6 


 


Lymph # (Auto)  0.9 L 


 


Mono # (Auto)  0.3 


 


Eos # (Auto)  0.0 


 


Baso # (Auto)  0.0 


 


Absolute Nucleated RBC  0.00 


 


Nucleated RBC %  0.0 


 


Sodium   137


 


Potassium   3.7


 


Chloride   102


 


Carbon Dioxide   26


 


Anion Gap   9.0


 


BUN   14


 


Creatinine   0.5


 


Estimated GFR (MDRD)   130


 


Glucose   126 H


 


Calcium   9.1


 


Total Bilirubin   0.8


 


AST   13


 


ALT   15


 


Alkaline Phosphatase   72


 


Total Protein   7.0


 


Albumin   4.1


 


Globulin   2.9


 


Albumin/Globulin Ratio   1.4


 


Lipase   15 L














PD MEDICAL DECISION MAKING





- ED course


Complexity details: reviewed results, re-evaluated patient, considered 

differential, d/w patient


ED course: 





Patient is well-appearing, nontoxic.  Afebrile.  Given pain medication, 

antiemetics.  Appears to likely have a viral gastroenteritis.  Suspect that this

will improve over the next 1 to 2 days.  Will prescribe antinausea medication 

for home.  Patient request to go home at this time.  Tolerating p.o. without di

fficulty here.  Patient counseled regarding signs and symptoms for which I 

believe and urgent re-evaluation would be necessary. Patient with good 

understanding of and agreement to plan and is comfortable going home at this 

time





This document was made in part using voice recognition software. While efforts 

are made to proofread this document, sound alike and grammatical errors may 

occur.





Departure





- Departure


Disposition: 01 Home, Self Care


Clinical Impression: 


Vomiting


Qualifiers:


 Vomiting type: unspecified Vomiting Intractability: non-intractable Nausea 

presence: with nausea Qualified Code(s): R11.2 - Nausea with vomiting, 

unspecified





Abdominal pain


Qualifiers:


 Abdominal location: generalized Qualified Code(s): R10.84 - Generalized 

abdominal pain





Condition: Good


Instructions:  ED Nausea Vomiting


Follow-Up: 


Carissa Bowden MD [Primary Care Provider] - Within 1 week


Prescriptions: 


Ondansetron Odt [Zofran] 4 mg TL Q6H PRN #10 tablet


 PRN Reason: Nausea / Vomiting


Comments: 


Your prescriptions were sent to [facility] in [place].  Please follow-up with 

your doctor for further care.  Return if you worsen. Drink plenty of fluids and 

rest tonight.

## 2022-01-17 ENCOUNTER — HOSPITAL ENCOUNTER (EMERGENCY)
Dept: HOSPITAL 76 - ED | Age: 52
Discharge: HOME | End: 2022-01-17
Payer: MEDICAID

## 2022-01-17 VITALS — SYSTOLIC BLOOD PRESSURE: 133 MMHG | DIASTOLIC BLOOD PRESSURE: 91 MMHG

## 2022-01-17 DIAGNOSIS — F17.200: ICD-10-CM

## 2022-01-17 DIAGNOSIS — K04.7: Primary | ICD-10-CM

## 2022-01-17 PROCEDURE — 99282 EMERGENCY DEPT VISIT SF MDM: CPT

## 2022-01-17 PROCEDURE — 99283 EMERGENCY DEPT VISIT LOW MDM: CPT

## 2022-01-17 NOTE — ED PHYSICIAN DOCUMENTATION
History of Present Illness





- Stated complaint


Stated Complaint: RT FACE PX





- Chief complaint


Chief Complaint: Heent





- History obtained from


History obtained from: Patient (51-year-old woman had a tooth pulled from the 

right mandible about a month ago and over the last couple of days has developed 

a painful lump there and some chills.)





Review of Systems


Constitutional: denies: Fever, Chills


Eyes: reports: Reviewed and negative


Ears: reports: Reviewed and negative


Nose: reports: Reviewed and negative





PD PAST MEDICAL HISTORY





- Past Medical History


Cardiovascular: None


Respiratory: None


Neuro: Migraines, Seizure disorder


Endocrine/Autoimmune: None


GI: Ulcers


GYN: Endometriosis


: None


HEENT: None


Psych: None


Musculoskeletal: Chronic back pain


Derm: None





- Past Surgical History


Past Surgical History: Yes


General: Cholecystectomy, Hiatal hernia repair


/GYN:  section, Endometrial ablation, Dilation and currettage, 

Hysterectomy, Oophrectomy





- Present Medications


Home Medications: 


                                Ambulatory Orders











 Medication  Instructions  Recorded  Confirmed


 


Doxycycline Hyclate 100 mg PO BID #20 capsule 18


 


Albuterol Sulf [Ventolin Hfa 1 - 2 puffs INH Q4HR PRN #1 inhaler 19





Inhaler]   


 


Hydrocodone/Acetaminophen 1 - 2 tab PO Q6H PRN #10 tablet 10/12/20 





[Hydrocodone-Acetamin 5-325 mg]   


 


Ondansetron Odt [Zofran] 4 mg TL Q6H PRN #10 tablet 21 


 


Oxycodone HCl/Acetaminophen 1 - 2 each PO Q6H PRN #14 tablet 22 





[Percocet 5-325 mg Tablet]   


 


clindamycin HCL [Cleocin HCl] 300 mg PO QID #28 cap 22 














- Allergies


Allergies/Adverse Reactions: 


                                    Allergies











Allergy/AdvReac Type Severity Reaction Status Date / Time


 


ketorolac [From Toradol] AdvReac  Unknown Verified 22 19:26


 


metronidazole [From Flagyl] AdvReac  Hives Verified 22 19:26


 


Penicillins AdvReac  Nausea Verified 22 19:26


 


prochlorperazine AdvReac  Unknown Verified 22 19:26





[From Compazine]     


 


sumatriptan [From Imitrex] AdvReac  Nausea Verified 22 19:26














- Social History


Does the pt smoke?: Yes


Smoking Status: Current every day smoker


Does the pt drink ETOH?: No


Does the pt have substance abuse?: No





- Immunizations


Immunizations are current?: Yes


Immunizations: TDAP current <10years





- POLST


Patient has POLST: No





PD ED PE NORMAL





- Vitals


Vital signs reviewed: Yes





- General


General: Alert and oriented X 3, No acute distress





- HEENT


HEENT: Other (She has a nonfluctuant mass on the right mandibular gingiva 

consistent with a phlegmon it is quite tender.  There is no sublingual edema or 

trismus.)





- Neck


Neck: Supple, no meningeal sign, No bony TTP





- Neuro


Neuro: Alert and oriented X 3, Normal speech





Results





- Vitals


Vitals: 


                               Vital Signs - 24 hr











  22





  19:26


 


Temperature 36.9 C


 


Heart Rate 68


 


Respiratory 18





Rate 


 


Blood Pressure 135/101 H


 


O2 Saturation 97








                                     Oxygen











O2 Source                      Room air

















PD MEDICAL DECISION MAKING





- ED course


ED course: 





51-year-old woman with a dental infection.  She may develop more of an abscess 

but does not feel "right" for incision and drainage at this juncture.  Discussed

need for follow-up with a dentist.  No signs of Ludewig's angina at this 

juncture.





Departure





- Departure


Disposition: 01 Home, Self Care


Clinical Impression: 


 Dental abscess





Condition: Good


Record reviewed to determine appropriate education?: Yes


Instructions:  ED Dental Abscess Facial Cellulitis


Prescriptions: 


clindamycin HCL [Cleocin HCl] 300 mg PO QID #28 cap


Oxycodone HCl/Acetaminophen [Percocet 5-325 mg Tablet] 1 - 2 each PO Q6H PRN #14

tablet


 PRN Reason: pain


Comments: 


I sent your prescription electronically to [facility] in [place].





As discussed, return if it feels like it is more liquid inside and we can 

reassess whether there is an abscess that we could incise and drain.





Regardless you should follow-up with your dentist for further evaluation and 

treatment as soon as possible.





I am prescribing a short course of narcotic pain medication for you. These are 

potentially dangerous and addictive medications that should be used carefully.


These medications may constipate you. Take an over-the-counter stool softener 

(docusate) twice daily with plenty of water while taking these medications. If 

you go 24 hours without a bowel movement, take over-the-counter miralax, per 

package instructions.


Do not drink or drive while taking these medications.


If you received narcotic or sedating medications while in the emergency 

department, do not drive for 24 hours.


Store this medication in a safe, secure place and out of reach of children.


It is a violation of federal law to give or sell this medication to another 

person or to use in a manner other than prescribed.


The ED will not refill narcotic prescriptions, including prescriptions lost or 

stolen.


To dispose of unwanted medications:


1. [facility] [address] in 

[place] has a medication drop box. They accept prescription medications (in 

pill form) Monday through Friday 9:00 a.m. to 5:00 p.m.


2. The [facility] [employer] accepts prescription medications (in

pill form only) for disposal year round. Call [phone] for more 

information.


3. Contact the [facility] for the next [facility] sponsored prescription 

drug collection event. [phone], [phone], or [phone] 

[phone];


Note that many narcotic pain relievers also contain Tylenol/acetaminophen.  

Please ensure that your total dose of acetaminophen from all sources does not 

exceed 3 g (3000 mg) per day.

## 2022-01-27 ENCOUNTER — HOSPITAL ENCOUNTER (OUTPATIENT)
Dept: HOSPITAL 76 - DI.S | Age: 52
Discharge: HOME | End: 2022-01-27
Attending: NURSE PRACTITIONER
Payer: MEDICAID

## 2022-01-27 DIAGNOSIS — M17.11: Primary | ICD-10-CM

## 2022-01-27 DIAGNOSIS — M25.461: ICD-10-CM

## 2022-01-28 NOTE — XRAY REPORT
PROCEDURE:  Knee 3 View RT

 

INDICATIONS:  KNEE JOINT PAIN, RIGHT

 

TECHNIQUE:  3 views of the right knee(s) were acquired.  

 

COMPARISON:  None.

 

FINDINGS:  

 

Bones:  No fractures or dislocations. Mild medial femoral tibial compartment osteoarthritis is seen w
ith joint space narrowing and subchondral sclerosis.  No suspicious bony lesions.  

 

Soft tissues: Small suprapatellar joint effusion is seen.  No suspicious soft tissue calcifications. 
 

 

IMPRESSION:  Mild medial femoral tibial compartment osteoarthritis and small joint effusion. No fract
ure or dislocation.

 

Reviewed by: Fransisco Lennon MD on 1/28/2022 9:13 AM PST

Approved by: Fransisco Lennon MD on 1/28/2022 9:13 AM PST

 

 

Station ID:  [id number]  Name: Amy Colmenares  MRN:  [id number]  Date of service:  7/24/2020 07/24/20 1418   General   Missed reason Patient declined   Subjective   Subjective Pt states that she just got into bed from going to the restroom and does not want to get back up and walk or increase her distance of amb from just going to the restroom and back.         Electronically signed by Adriana Michel PTA on 7/24/2020 at 2:19 PM

## 2022-03-08 ENCOUNTER — HOSPITAL ENCOUNTER (OUTPATIENT)
Dept: HOSPITAL 76 - DI.S | Age: 52
Discharge: HOME | End: 2022-03-08
Attending: NURSE PRACTITIONER
Payer: MEDICAID

## 2022-03-08 DIAGNOSIS — R22.1: ICD-10-CM

## 2022-03-08 DIAGNOSIS — R09.89: Primary | ICD-10-CM

## 2022-03-08 DIAGNOSIS — Z13.228: ICD-10-CM

## 2022-03-08 DIAGNOSIS — F17.200: ICD-10-CM

## 2022-03-08 DIAGNOSIS — E78.5: ICD-10-CM

## 2022-03-08 LAB
ALBUMIN DIAFP-MCNC: 4.2 G/DL (ref 3.2–5.5)
ALBUMIN/GLOB SERPL: 1.6 {RATIO} (ref 1–2.2)
ALP SERPL-CCNC: 58 IU/L (ref 42–121)
ALT SERPL W P-5'-P-CCNC: 19 IU/L (ref 10–60)
ANION GAP SERPL CALCULATED.4IONS-SCNC: 10 MMOL/L (ref 6–13)
AST SERPL W P-5'-P-CCNC: 16 IU/L (ref 10–42)
BASOPHILS NFR BLD AUTO: 0 10^3/UL (ref 0–0.1)
BASOPHILS NFR BLD AUTO: 0.4 %
BILIRUB BLD-MCNC: 0.4 MG/DL (ref 0.2–1)
BUN SERPL-MCNC: 19 MG/DL (ref 6–20)
CALCIUM UR-MCNC: 9.1 MG/DL (ref 8.5–10.3)
CHLORIDE SERPL-SCNC: 102 MMOL/L (ref 101–111)
CHOLEST SERPL-MCNC: 237 MG/DL
CO2 SERPL-SCNC: 28 MMOL/L (ref 21–32)
CREAT SERPLBLD-SCNC: 0.6 MG/DL (ref 0.4–1)
EOSINOPHIL # BLD AUTO: 0.1 10^3/UL (ref 0–0.7)
EOSINOPHIL NFR BLD AUTO: 1.3 %
ERYTHROCYTE [DISTWIDTH] IN BLOOD BY AUTOMATED COUNT: 13.7 % (ref 12–15)
GFRSERPLBLD MDRD-ARVRAT: 105 ML/MIN/{1.73_M2} (ref 89–?)
GLOBULIN SER-MCNC: 2.6 G/DL (ref 2.1–4.2)
GLUCOSE SERPL-MCNC: 98 MG/DL (ref 70–100)
HCT VFR BLD AUTO: 44 % (ref 37–47)
HDLC SERPL-MCNC: 54 MG/DL
HDLC SERPL: 4.4 {RATIO} (ref ?–4.4)
HGB UR QL STRIP: 14.7 G/DL (ref 12–16)
LDLC SERPL CALC-MCNC: 152 MG/DL
LDLC/HDLC SERPL: 2.8 {RATIO} (ref ?–4.4)
LYMPHOCYTES # SPEC AUTO: 3 10^3/UL (ref 1.5–3.5)
LYMPHOCYTES NFR BLD AUTO: 39 %
MCH RBC QN AUTO: 33.1 PG (ref 27–31)
MCHC RBC AUTO-ENTMCNC: 33.4 G/DL (ref 32–36)
MCV RBC AUTO: 99.1 FL (ref 81–99)
MONOCYTES # BLD AUTO: 0.6 10^3/UL (ref 0–1)
MONOCYTES NFR BLD AUTO: 7.1 %
NEUTROPHILS # BLD AUTO: 4.1 10^3/UL (ref 1.5–6.6)
NEUTROPHILS # SNV AUTO: 7.8 X10^3/UL (ref 4.8–10.8)
NEUTROPHILS NFR BLD AUTO: 51.9 %
NRBC # BLD AUTO: 0 /100WBC
NRBC # BLD AUTO: 0 X10^3/UL
PDW BLD AUTO: 12.5 FL (ref 7.9–10.8)
PLATELET # BLD: 161 10^3/UL (ref 130–450)
POTASSIUM SERPL-SCNC: 3.8 MMOL/L (ref 3.5–5)
PROT SPEC-MCNC: 6.8 G/DL (ref 6.7–8.2)
RBC MAR: 4.44 10^6/UL (ref 4.2–5.4)
SODIUM SERPLBLD-SCNC: 140 MMOL/L (ref 135–145)
T4 SERPL-MCNC: 5.98 UG/DL (ref 6.09–12.23)
TRIGL P FAST SERPL-MCNC: 154 MG/DL
TSH SERPL-ACNC: 5.86 UIU/ML (ref 0.34–5.6)
VLDLC SERPL-SCNC: 31 MG/DL

## 2022-03-08 PROCEDURE — 80053 COMPREHEN METABOLIC PANEL: CPT

## 2022-03-08 PROCEDURE — 84436 ASSAY OF TOTAL THYROXINE: CPT

## 2022-03-08 PROCEDURE — 84443 ASSAY THYROID STIM HORMONE: CPT

## 2022-03-08 PROCEDURE — 85025 COMPLETE CBC W/AUTO DIFF WBC: CPT

## 2022-03-08 PROCEDURE — 36415 COLL VENOUS BLD VENIPUNCTURE: CPT

## 2022-03-08 PROCEDURE — 83721 ASSAY OF BLOOD LIPOPROTEIN: CPT

## 2022-03-08 PROCEDURE — 80061 LIPID PANEL: CPT

## 2022-03-08 PROCEDURE — 84480 ASSAY TRIIODOTHYRONINE (T3): CPT

## 2022-03-08 NOTE — XRAY REPORT
PROCEDURE:  Chest 2 View X-Ray

 

INDICATIONS:  CHEST CONGESTION

 

TECHNIQUE:  2 view(s) of the chest.  

 

COMPARISON:  None.

 

FINDINGS:  

 

Surgical changes and devices:  None.  

 

Lungs and pleura:  No pleural effusions or pneumothorax.  Lungs are clear.  

 

Mediastinum:  Mediastinal contours are normal.  Heart size is normal.  

 

Bones and chest wall:  No suspicious bony abnormalities.  Soft tissues appear unremarkable.  

 

IMPRESSION:  No acute cardiopulmonary pathology.

 

Reviewed by: Fransisco Lennon MD on 3/8/2022 8:58 AM PST

Approved by: Fransisco Lennon MD on 3/8/2022 8:58 AM [facility]

 

 

Station ID:  [id number]

## 2022-03-14 ENCOUNTER — HOSPITAL ENCOUNTER (OUTPATIENT)
Dept: HOSPITAL 76 - DI | Age: 52
Discharge: HOME | End: 2022-03-14
Attending: NURSE PRACTITIONER
Payer: MEDICAID

## 2022-03-14 DIAGNOSIS — E04.1: ICD-10-CM

## 2022-03-14 DIAGNOSIS — J98.09: ICD-10-CM

## 2022-03-14 DIAGNOSIS — Z12.2: Primary | ICD-10-CM

## 2022-03-14 DIAGNOSIS — F17.210: ICD-10-CM

## 2022-03-15 NOTE — ULTRASOUND REPORT
PROCEDURE:  Head or Neck Soft Tissue

 

INDICATIONS:  KNOT IN THROAT

 

TECHNIQUE:  

Real-time scanning was performed of the thyroid gland, with image documentation.  

 

COMPARISON:  None

 

FINDINGS:  

Right:  Thyroid lobe measures 4.1 x 1.2 x 1.6 cm, and is homogeneous in echotexture.  

Left:  Thyroid lobe measures 3.2 x 1.4 x 1.7 cm, and is homogenous in echotexture.  

Isthmus:  0.25 cm thick.  

 

Nodule number:  One

Location:  Left isthmus

Size:  1.5 x 0.9 x 1.5 cm.  

Composition:  Solid  

Echogenicity:  Hypoechoic   

Shape:  wider than tall.

Margins:  Smooth

Echogenic foci:  None

Total points:  4

ACR TI-RADS category:  TI-RADS 4: Moderately suspicious

 

IMPRESSION:  Left isthmus 1.5 cm thyroid nodule. Recommend ultrasound-guided fine-needle aspiration f
or further evaluation.

 

ACR TI-RADS definitions and recommendations:  

TI-RADS 1 (benign): 0 points.  FNA not needed. 

TI-RADS 2 (not suspicious): 2 points.  FNA not needed. 

TI-RADS 3 (mildly suspicious): 3 points. 

 "FNA if 2.5 cm or larger, follow up if 1.5 cm or larger (at 1, 3, and 5 years). 

TI-RADS 4 (moderately suspicious): 4-6 points.  

 "FNA if 1.5 cm or larger, follow up if 1 cm or larger (at 1, 2, 3, and 5 years).  

TI-RADS 5 (highly suspicious): 7 points or more.  

 "FNA if 1 cm or larger, follow up if 0.5 cm or larger (every year for 5 years).  

 

Reviewed by: Sergio Pearl MD on 3/15/2022 10:14 AM PDT

Approved by: Sergio Pearl MD on 3/15/2022 10:14 AM PDT

 

 

Station ID:  [id number]

## 2022-03-15 NOTE — CT REPORT
PROCEDURE:  Low Dose Lung Cancer Screen

 

INDICATIONS:  TOBACCO ABUSE

 

TECHNIQUE:  

Noncontrast low-dose images were acquired from the pulmonary apices to the posterior costophrenic ang
les.  Multiplanar MIP reformats were then acquired.  For radiation dose reduction, the following was 
used:  automated exposure control, adjustment of mA and/or kV according to patient size.

 

COMPARISON:  None. Correlation made to chest x-ray 3/8/2022

 

FINDINGS:  

Image quality:  Excellent.  

 

Lungs and pleura:  Central and peripheral airways are patent. There is mild peripheral airway narrowi
ng and bronchial wall thickening bilaterally. Occasional airway occlusion in the posterior basal segm
ent left lower lobe. There is slight mosaic attenuation in the right middle and medial right lower lo
be, and anterior left lower lobe. Calcified subpleural nodule measuring 4 mm, lateral right upper lob
e, 4/111. Subpleural groundglass nodule anterior right middle lobe, 4/254 measuring 2 mm. Pleural-bas
ed 4 mm solid nodule medial superior segment right lower lobe. No suspicious masses. No consolidation
s, pleural effusions, or calcified pleural plaques.

 

Mediastinum:  Heart size is normal.  No pericardial effusion.  Mild coronary artery calcification. No
 mediastinal adenopathy by size criteria.  Thoracic aorta and central pulmonary arteries are normal i
n size.  Esophagus is normal in caliber.  No hiatal hernia.  

 

Bones and chest wall:  No suspicious bony lesions.  No vertebral body compression fractures.  No axil
ramone or supraclavicular adenopathy by size criteria.  The thyroid gland is not well seen or evaluated
 due to technique.

 

Abdomen:  Surgical changes and GE junction below the diaphragm. Surgically absent gallbladder. Visual
ized upper abdomen solid organs and bowel loops appear otherwise normal in the absence of contrast.  


 

IMPRESSION:  

1. Tiny right lung nodules, benign given size. Continue annual low-dose chest CT screening.

2. Mild lower lung bronchial wall thickening suggesting chronic bronchitis probably related to COPD. 
Correlate clinically..

 

 

Reviewed by: Cynthia Robles MD on 3/15/2022 12:40 PM PDT

Approved by: Cynthia Robles MD on 3/15/2022 12:40 PM PDT

 

 

Station ID:  [id number]

## 2022-05-10 ENCOUNTER — HOSPITAL ENCOUNTER (OUTPATIENT)
Dept: HOSPITAL 76 - LAB.S | Age: 52
Discharge: HOME | End: 2022-05-10
Attending: NURSE PRACTITIONER
Payer: MEDICAID

## 2022-05-10 DIAGNOSIS — E03.9: Primary | ICD-10-CM

## 2022-05-10 LAB — TSH SERPL-ACNC: 1.69 UIU/ML (ref 0.34–5.6)

## 2022-05-10 PROCEDURE — 84443 ASSAY THYROID STIM HORMONE: CPT

## 2022-05-10 PROCEDURE — 36415 COLL VENOUS BLD VENIPUNCTURE: CPT

## 2022-09-01 ENCOUNTER — HOSPITAL ENCOUNTER (OUTPATIENT)
Dept: HOSPITAL 76 - DI | Age: 52
Discharge: HOME | End: 2022-09-01
Attending: NURSE PRACTITIONER
Payer: MEDICAID

## 2022-09-01 DIAGNOSIS — M47.812: Primary | ICD-10-CM

## 2022-09-01 NOTE — MRI REPORT
PROCEDURE:  Cervical Spine W/O

 

INDICATIONS:  CERVICAL SPINE STENOSIS

 

TECHNIQUE:  

Noncontrast sagittal T1 spin echo and T2 fast spin echo, sagittal STIR, foraminal oblique sagittal T2
 fast spin echo, and axial gradient echo or T2 fast spin echo through the cervical spine.  

 

COMPARISON:  07/12/2019 MRI cervical spine

 

FINDINGS:  

Normal configuration of the craniocervical junction. Normal cervical spine vertebral body height and 
alignment. No suspicious focal marrow signal abnormality. Mild thin linear discogenic marrow edema at
 the opposing C2-C3, C3-C4, C4-C5, and C5-C6 endplates. Normal morphology and signal intensity of the
 cervical cord. There is no syrinx. Regional prevertebral and paraspinous soft tissues demonstrate no
 acute finding.

 

C2-C3:  No spinal canal or neural foraminal stenosis.  

 

C3-C4:   No spinal canal or neural foraminal stenosis.

 

C4-C5:  No spinal canal or neural foraminal stenosis. Left greater than right uncovertebral hypertrop
hy noted.

 

C5-C6:  No spinal canal stenosis. Uncovertebral hypertrophy without neural foraminal stenosis.

 

C6-C7:  No spinal canal or neural foraminal stenosis.

 

C7-T1:  No spinal canal or neural foraminal stenosis.

 

 

IMPRESSION:  

No spinal canal or neural foraminal stenosis.

 

Left posterolateral C4-C5 disc protrusion seen on the prior study has resolved. Associated left neura
l foraminal narrowing is also resolved.

 

Reviewed by: Jaron De Los Santos MD on 9/1/2022 10:32 AM PDT

Approved by: Jaron De Los aSntos MD on 9/1/2022 10:32 AM PDT

 

 

Station ID:  [id number]

## 2022-10-12 ENCOUNTER — HOSPITAL ENCOUNTER (EMERGENCY)
Dept: HOSPITAL 76 - ED | Age: 52
Discharge: HOME | End: 2022-10-12
Payer: MEDICAID

## 2022-10-12 VITALS — DIASTOLIC BLOOD PRESSURE: 71 MMHG | SYSTOLIC BLOOD PRESSURE: 122 MMHG

## 2022-10-12 DIAGNOSIS — J40: ICD-10-CM

## 2022-10-12 DIAGNOSIS — U07.1: Primary | ICD-10-CM

## 2022-10-12 DIAGNOSIS — J32.9: ICD-10-CM

## 2022-10-12 DIAGNOSIS — F17.200: ICD-10-CM

## 2022-10-12 PROCEDURE — 87635 SARS-COV-2 COVID-19 AMP PRB: CPT

## 2022-10-12 PROCEDURE — 99284 EMERGENCY DEPT VISIT MOD MDM: CPT

## 2022-10-12 PROCEDURE — 71045 X-RAY EXAM CHEST 1 VIEW: CPT

## 2022-10-12 NOTE — ED PHYSICIAN DOCUMENTATION
History of Present Illness





- Stated complaint


Stated Complaint: VOMITING





- Chief complaint


Chief Complaint: Resp





- History obtained from


History obtained from: Patient





- History of Present Illness


Timing: How many weeks ago (3)


Improved by: nothing


Worsened by: abdominal discomfort worse with coughing, vomiting





- Additonal information


Additional information: 





c/o "sinus infection and bronchitis" (per patient). she describes cough 

productive of thick, foul-tasting and discolored mucous/phlegm, mild dyspnea, ge

neralized myalgias. She says these symptoms have been going on for approximately

3 weeks. Since yesterday, she has intermittent vomiting as well with upper 

abdominal aching pain which she attributes to the coughing and , now, the 

vomiting as well. Subjective fever (felt feverish but did not take temperature 

at home). Was in [place] until a few days ago. 





Review of Systems


Constitutional: reports: Fever (subjective (did not take temperature)), 

Myalgias, Fatigue


Ears: denies: Ear pain


Throat: denies: Sore throat


Cardiac: reports: Reviewed and negative


Respiratory: reports: Dyspnea, Cough.  denies: Hemoptysis


GI: reports: Abdominal Pain, Nausea, Vomiting.  denies: Constipation, Diarrhea


: denies: Dysuria, Frequency


Neurologic: denies: Generalized weakness





PD PAST MEDICAL HISTORY





- Past Medical History


Cardiovascular: None


Respiratory: None


Neuro: Migraines, Seizure disorder


Endocrine/Autoimmune: None


GI: Ulcers


GYN: Endometriosis


: None


HEENT: None


Psych: None


Musculoskeletal: Chronic back pain


Derm: None





- Past Surgical History


Past Surgical History: Yes


General: Cholecystectomy, Hiatal hernia repair


/GYN:  section, Endometrial ablation, Dilation and currettage, 

Hysterectomy, Oophrectomy





- Present Medications


Home Medications: 


                                Ambulatory Orders











 Medication  Instructions  Recorded  Confirmed


 


Doxycycline Hyclate 100 mg PO BID #20 capsule 18


 


Albuterol Sulf [Ventolin Hfa 1 - 2 puffs INH Q4HR PRN #1 inhaler 19





Inhaler]   


 


Hydrocodone/Acetaminophen 1 - 2 tab PO Q6H PRN #10 tablet 10/12/20 





[Hydrocodone-Acetamin 5-325 mg]   


 


Ondansetron Odt [Zofran] 4 mg TL Q6H PRN #10 tablet 21 


 


Oxycodone HCl/Acetaminophen 1 - 2 each PO Q6H PRN #14 tablet 22 





[Percocet 5-325 mg Tablet]   


 


clindamycin HCL [Cleocin HCl] 300 mg PO QID #28 cap 22 


 


Amox/Clav 875/125 [Augmentin 1 tablet PO Q12H 10 Days #20 tablet 10/12/22 





875/125 Tab]   


 


Promethazine [Phenergan] 25 mg PO Q6H PRN #14 tab 10/12/22 














- Allergies


Allergies/Adverse Reactions: 


                                    Allergies











Allergy/AdvReac Type Severity Reaction Status Date / Time


 


ketorolac [From Toradol] AdvReac  Unknown Verified 10/12/22 18:35


 


metronidazole [From Flagyl] AdvReac  Hives Verified 10/12/22 18:35


 


Penicillins AdvReac  Nausea Verified 10/12/22 18:35


 


prochlorperazine AdvReac  Unknown Verified 10/12/22 18:35





[From Compazine]     


 


sumatriptan [From Imitrex] AdvReac  Nausea Verified 10/12/22 18:35














- Social History


Does the pt smoke?: Yes


Smoking Status: Current every day smoker


Does the pt drink ETOH?: No


Does the pt have substance abuse?: No





- Immunizations


Immunizations are current?: Yes


Immunizations: TDAP current <10years





- POLST


Patient has POLST: No





PD ED PE NORMAL





- Vitals


Vital signs reviewed: Yes





- General


General: Alert and oriented X 3, No acute distress, Well developed/nourished





- HEENT


HEENT: Moist mucous membranes





- Cardiac


Cardiac: RRR, No murmur





- Respiratory


Respiratory: No respiratory distress, Other (trace end-expiratory wheezing 

bilaterally )





- Abdomen


Abdomen: Normal bowel sounds, Soft, Non tender, Non distended





- Derm


Derm: Normal color, Warm and dry





- Extremities


Extremities: No edema





Results





- Vitals


Vitals: 


                               Vital Signs - 24 hr











  10/12/22





  18:30


 


Temperature 37.6 C


 


Heart Rate 106 H


 


Respiratory 18





Rate 


 


Blood Pressure 122/71


 


O2 Saturation 97








                                     Oxygen











O2 Source                      Room air

















- Labs


Labs: 


                                Laboratory Tests











  10/12/22





  15:38


 


SARS-CoV-2 (PCR)  DETECTED A














- Rads (name of study)


  ** chest xray


Radiology: Prelim report reviewed, See rad report





PD MEDICAL DECISION MAKING





- ED course


Complexity details: reviewed results, considered differential, d/w patient


ED course: 





COVID positive on tonight's test. NAD on CXR. Her description is suggestive of a

secondary superimposed infection given length of time of these symptoms and 

worsening of the symptoms, and thus given augmentin with rx for same. Also 

phenergan/rx for n/v. Results d/w patient, return precautions given. 


NOTE that patient says she has had amoxicillin in the past WITHOUT adverse 

reaction as well as phenergan in the past WITHOUT adverse reaction (despite 

allergy list noting penicillin and prochlorperazine allergies)





Departure





- Departure


Disposition: 01 Home, Self Care


Clinical Impression: 


 Sinusitis, Bronchitis, COVID-19





Condition: Good


Instructions:  ED Sinusitis Abx Tx, ED Viral Syndrome


Prescriptions: 


Amox/Clav 875/125 [Augmentin 875/125 Tab] 1 tablet PO Q12H 10 Days #20 tablet


Promethazine [Phenergan] 25 mg PO Q6H PRN #14 tab


 PRN Reason: Nausea / Vomiting


Comments: 


You tested POSITIVE for COVID-19 tonight. Based on your description of symptoms 

and how long they have been lasting, along with symptoms generally worsening, I 

am prescribing an antibiotic for possible secondary bacterial infection (when a 

bacteria causes infection on top of a viral infection you are already trying to 

fight off). 


The prescriptions for the antibiotic and anti-nausea medication have been both 

electronically submitted to [facility] in [place]. 





You can get information on quarantining, as well as other issues such as whether

 household members should isolate and/or get tested, at the CDC website.





https://www.cdc.gov/coronavirus/2019-ncov/your-health/isolation.html





An easier way to get to the website (rather than type the above into the address

 bar) is to google "CDC covid quarantine" and go to the website that matches the

 above address.

## 2022-10-12 NOTE — XRAY REPORT
PROCEDURE:  Chest 1 View X-Ray

 

INDICATIONS:  Cough with SOA

 

TECHNIQUE:  One view of the chest was acquired.  

 

COMPARISON:  Chest x-ray 2 views, 3/8/2022

 

FINDINGS:  

 

Surgical changes and devices:  None.  

 

Lungs and pleura:  No pleural effusions or pneumothorax.  Lungs are clear.  

 

Mediastinum:  Mediastinal contours appear normal.  Heart size is normal.  

 

Bones and chest wall:  No suspicious bony lesions.  Overlying soft tissues appear unremarkable.  

 

 

IMPRESSION:  

 

No acute cardiopulmonary disease.

 

Reviewed by: Camilo Resendiz MD on 10/12/2022 7:39 PM PDT

Approved by: Camilo Resendiz MD on 10/12/2022 7:39 PM PDT

 

 

Station ID:  [id number]

## 2023-04-06 ENCOUNTER — HOSPITAL ENCOUNTER (OUTPATIENT)
Dept: HOSPITAL 76 - DI.S | Age: 53
Discharge: HOME | End: 2023-04-06
Attending: NURSE PRACTITIONER
Payer: MEDICAID

## 2023-04-06 DIAGNOSIS — J20.9: Primary | ICD-10-CM

## 2023-04-07 NOTE — XRAY REPORT
PROCEDURE:  Chest 2 View X-Ray

 

INDICATIONS:  BRONCHITIS ACUTEW/SPASM

 

TECHNIQUE:  2 views of the chest were acquired.  

 

COMPARISON:  Chest x-ray 10/12/2022

 

FINDINGS:  

 

Surgical changes and devices:  None.  

 

Lungs and pleura:  No pleural effusions or pneumothorax.  Lungs are clear.  

 

Mediastinum:  Mediastinal contours appear normal.  Heart size is normal.  

 

Bones and chest wall:  No suspicious bony lesions.  Overlying soft tissues appear unremarkable.  

 

 

IMPRESSION:  

 

No acute pulmonary process.

 

 

 

Reviewed by: Cheryle Forrester MD on 4/7/2023 10:55 AM PDT

Approved by: Cheryle Forrester MD on 4/7/2023 10:55 AM PDT

 

 

Station ID:  [id number]

## 2024-01-04 ENCOUNTER — HOSPITAL ENCOUNTER (OUTPATIENT)
Dept: HOSPITAL 76 - DI | Age: 54
Discharge: HOME | End: 2024-01-04
Attending: NURSE PRACTITIONER
Payer: MEDICAID

## 2024-01-04 DIAGNOSIS — M16.0: ICD-10-CM

## 2024-01-04 DIAGNOSIS — E04.1: Primary | ICD-10-CM

## 2024-01-04 DIAGNOSIS — M16.0: Primary | ICD-10-CM

## 2024-01-04 NOTE — ULTRASOUND REPORT
PROCEDURE:  Soft Tissue Head or Neck

 

INDICATIONS:  THYROID NODULE

 

TECHNIQUE:  

Real-time scanning was performed of the thyroid gland, with image documentation.  

 

COMPARISON:  Thyroid ultrasound on March 14, 2022

 

FINDINGS:  

Right:  Thyroid lobe measures 3. 2 x 2 x 1.3 cm, and is homogeneous in echotexture.  

Left:  Thyroid lobe measures 3.4 x 1.3 x 1.5 cm, and is homogenous in echotexture.  

Isthmus:  0.3  cm thick.  

 

Nodule number:  One

Location:  Left isthmus

Size:  1.6 x 1.1 x 1.4 cm, previously 1.5 x 0.9 x 1.5 cm.  

Composition:  Solid.

Echogenicity:  Hypoechoic (2 points).   

Shape:  wider than tall (0 points).

Margins:  Smooth (0 points).

Echogenic foci:  None (0 points).

Total points:  4

ACR TI-RADS category:  TI-RADS 4: Moderately suspicious.

 

IMPRESSION:  

Isthmus nodule has slightly increased in size measuring 1.6 x 1.1 x 1.4 cm. TIRADS-4. Recommend ultra
sound-guided FNA.

 

ACR TI-RADS definitions and recommendations:  

TI-RADS 1 (benign): 0 points.  FNA not needed. 

TI-RADS 2 (not suspicious): 2 points.  FNA not needed. 

TI-RADS 3 (mildly suspicious): 3 points. 

 "FNA if 2.5 cm or larger, follow up if 1.5 cm or larger (at 1, 3, and 5 years). 

TI-RADS 4 (moderately suspicious): 4-6 points.  

 "FNA if 1.5 cm or larger, follow up if 1 cm or larger (at 1, 2, 3, and 5 years).  

TI-RADS 5 (highly suspicious): 7 points or more.  

 "FNA if 1 cm or larger, follow up if 0.5 cm or larger (every year for 5 years).  

 

Reviewed by: Marychuy Ivy MD on 1/4/2024 7:50 PM PST

Approved by: Marychuy Ivy MD on 1/4/2024 7:50 PM PST

 

 

Station ID:  [id number]

## 2024-01-04 NOTE — XRAY REPORT
PROCEDURE:  Hips w/Pelvis 2-3V BL

 

INDICATIONS:  RIGHT HIP PAIN

 

TECHNIQUE:  An AP view the pelvis and bilateral frog-leg lateral views of the hips were acquired.  

 

COMPARISON:  None.

 

FINDINGS:  

 

Bones:  No fractures or dislocations.  No suspicious bony lesions.   Mild bilateral hip degenerative 
change.

 

Soft tissues:  No suspicious soft tissue calcifications or masses.  

 

 

IMPRESSION:  

Mild bilateral hip degenerative change.

 

 

 

Reviewed by: Alverto Holly MD on 1/4/2024 1:07 PM PST

Approved by: Alverto Holly MD on 1/4/2024 1:07 PM PST

 

 

Station ID:  [id number]

## 2024-01-16 ENCOUNTER — HOSPITAL ENCOUNTER (OUTPATIENT)
Dept: HOSPITAL 76 - DI | Age: 54
Discharge: HOME | End: 2024-01-16
Attending: NURSE PRACTITIONER
Payer: MEDICAID

## 2024-01-16 DIAGNOSIS — Z53.9: Primary | ICD-10-CM

## 2024-03-06 ENCOUNTER — HOSPITAL ENCOUNTER (OUTPATIENT)
Dept: HOSPITAL 76 - DI | Age: 54
Discharge: HOME | End: 2024-03-06
Attending: NURSE PRACTITIONER
Payer: MEDICAID

## 2024-03-06 DIAGNOSIS — E04.1: Primary | ICD-10-CM

## 2024-03-06 PROCEDURE — 10005 FNA BX W/US GDN 1ST LES: CPT

## 2024-03-06 RX ADMIN — LIDOCAINE HYDROCHLORIDE ONE ML: 10 INJECTION, SOLUTION EPIDURAL; INFILTRATION; INTRACAUDAL; PERINEURAL at 10:59

## 2024-03-06 NOTE — ULTRASOUND REPORT
PROCEDURE:  FNA Bx w/US Gdn 1st Les

 

INDICATIONS:  THYROID NODULE

 

TECHNIQUE:  

The indications, alternatives, benefits, risks, and complications of the procedure were explained to 
the patient.  Written informed consent was obtained and placed in the chart.  The area of interest wa
s examined sonographically and a site was chosen for ultrasound guided percutaneous sampling.  The sk
in was prepared and draped in the usual fashion, and anesthetized with 1% lidocaine infiltrated from 
the skin down to the lesion.  Multiple passes were then performed, with contents emptied into an appr
[facility] pathology specimen container.  A bandage was applied to the area of access at completion of t
he study.  

 

COMPARISON:  None.

 

FINDINGS:  

Location(s) of lesion(s) sampled:  Left isthmus

Needles:  25 gauge hypodermic needles.  

Number of passes:  6

Medications:  1% lidocaine for local anaesthesia.  

Complications:  None.  

 

IMPRESSION:  

Successful ultrasound-guided Isthmus fine needle aspiration, with cytology results pending.  

 

Reviewed by: Blaze Hiltno MD on 3/6/2024 1:31 PM PST

Approved by: Blaze Hilton MD on 3/6/2024 1:31 PM PST

 

 

Station ID:  [id number]